# Patient Record
Sex: FEMALE | Race: WHITE | Employment: FULL TIME | URBAN - METROPOLITAN AREA
[De-identification: names, ages, dates, MRNs, and addresses within clinical notes are randomized per-mention and may not be internally consistent; named-entity substitution may affect disease eponyms.]

---

## 2017-01-18 ENCOUNTER — OFFICE VISIT (OUTPATIENT)
Dept: FAMILY MEDICINE CLINIC | Age: 47
End: 2017-01-18

## 2017-01-18 VITALS
DIASTOLIC BLOOD PRESSURE: 70 MMHG | BODY MASS INDEX: 23.46 KG/M2 | RESPIRATION RATE: 14 BRPM | SYSTOLIC BLOOD PRESSURE: 120 MMHG | HEIGHT: 66 IN | WEIGHT: 146 LBS | HEART RATE: 84 BPM | OXYGEN SATURATION: 98 % | TEMPERATURE: 97.7 F

## 2017-01-18 DIAGNOSIS — J01.40 ACUTE NON-RECURRENT PANSINUSITIS: Primary | ICD-10-CM

## 2017-01-18 PROCEDURE — 99213 OFFICE O/P EST LOW 20 MIN: CPT | Performed by: NURSE PRACTITIONER

## 2017-01-18 RX ORDER — FLUCONAZOLE 100 MG/1
100 TABLET ORAL DAILY
Qty: 2 TABLET | Refills: 0 | Status: SHIPPED | OUTPATIENT
Start: 2017-01-18 | End: 2017-01-25

## 2017-01-18 RX ORDER — FLUTICASONE PROPIONATE 50 MCG
1 SPRAY, SUSPENSION (ML) NASAL DAILY
Qty: 1 BOTTLE | Refills: 0 | Status: SHIPPED | OUTPATIENT
Start: 2017-01-18 | End: 2020-01-10 | Stop reason: SDUPTHER

## 2017-01-18 RX ORDER — AMOXICILLIN AND CLAVULANATE POTASSIUM 875; 125 MG/1; MG/1
1 TABLET, FILM COATED ORAL 2 TIMES DAILY
Qty: 14 TABLET | Refills: 0 | Status: SHIPPED | OUTPATIENT
Start: 2017-01-18 | End: 2017-01-25

## 2017-01-18 ASSESSMENT — ENCOUNTER SYMPTOMS
SINUS PAIN: 1
ANAL BLEEDING: 0
ABDOMINAL DISTENTION: 0
ABDOMINAL PAIN: 0
SINUS PRESSURE: 1
SWOLLEN GLANDS: 0
VOICE CHANGE: 0
DIARRHEA: 0
RHINORRHEA: 1
CONSTIPATION: 0
NAUSEA: 0
VOMITING: 0
SORE THROAT: 1
EYES NEGATIVE: 1
SHORTNESS OF BREATH: 0
WHEEZING: 0
BLOOD IN STOOL: 0
CHEST TIGHTNESS: 0
TROUBLE SWALLOWING: 0
COUGH: 1

## 2017-02-14 ENCOUNTER — OFFICE VISIT (OUTPATIENT)
Dept: FAMILY MEDICINE CLINIC | Age: 47
End: 2017-02-14

## 2017-02-14 VITALS
TEMPERATURE: 96.4 F | BODY MASS INDEX: 23.69 KG/M2 | OXYGEN SATURATION: 98 % | DIASTOLIC BLOOD PRESSURE: 72 MMHG | SYSTOLIC BLOOD PRESSURE: 120 MMHG | HEART RATE: 76 BPM | HEIGHT: 66 IN | WEIGHT: 147.38 LBS

## 2017-02-14 DIAGNOSIS — J01.41 ACUTE RECURRENT PANSINUSITIS: Primary | ICD-10-CM

## 2017-02-14 PROCEDURE — 99213 OFFICE O/P EST LOW 20 MIN: CPT | Performed by: NURSE PRACTITIONER

## 2017-02-14 RX ORDER — AZITHROMYCIN 250 MG/1
TABLET, FILM COATED ORAL
Qty: 6 TABLET | Refills: 0 | Status: SHIPPED | OUTPATIENT
Start: 2017-02-14 | End: 2017-05-30 | Stop reason: ALTCHOICE

## 2017-02-21 ASSESSMENT — ENCOUNTER SYMPTOMS
WHEEZING: 0
DIARRHEA: 0
ABDOMINAL DISTENTION: 0
RHINORRHEA: 1
TROUBLE SWALLOWING: 0
COUGH: 1
VOICE CHANGE: 0
CONSTIPATION: 0
SHORTNESS OF BREATH: 0
SORE THROAT: 1
SINUS PRESSURE: 1
ANAL BLEEDING: 0
VOMITING: 0
ABDOMINAL PAIN: 0
CHEST TIGHTNESS: 0
EYES NEGATIVE: 1
NAUSEA: 0
HOARSE VOICE: 0
BLOOD IN STOOL: 0
SWOLLEN GLANDS: 0

## 2017-05-30 ENCOUNTER — OFFICE VISIT (OUTPATIENT)
Dept: FAMILY MEDICINE CLINIC | Age: 47
End: 2017-05-30

## 2017-05-30 VITALS
WEIGHT: 150.4 LBS | SYSTOLIC BLOOD PRESSURE: 122 MMHG | HEART RATE: 73 BPM | BODY MASS INDEX: 24.17 KG/M2 | HEIGHT: 66 IN | TEMPERATURE: 97.3 F | OXYGEN SATURATION: 98 % | RESPIRATION RATE: 12 BRPM | DIASTOLIC BLOOD PRESSURE: 78 MMHG

## 2017-05-30 DIAGNOSIS — N83.202 BILATERAL OVARIAN CYSTS: ICD-10-CM

## 2017-05-30 DIAGNOSIS — R10.2 PELVIC PAIN IN FEMALE: Primary | ICD-10-CM

## 2017-05-30 DIAGNOSIS — N92.0 MENORRHAGIA WITH REGULAR CYCLE: ICD-10-CM

## 2017-05-30 DIAGNOSIS — N83.201 BILATERAL OVARIAN CYSTS: ICD-10-CM

## 2017-05-30 DIAGNOSIS — N94.6 DYSMENORRHEA: ICD-10-CM

## 2017-05-30 PROCEDURE — 99213 OFFICE O/P EST LOW 20 MIN: CPT | Performed by: FAMILY MEDICINE

## 2017-05-30 RX ORDER — CLONAZEPAM 0.5 MG/1
0.5 TABLET ORAL 2 TIMES DAILY
Qty: 30 TABLET | Refills: 0 | Status: CANCELLED | OUTPATIENT
Start: 2017-05-30

## 2017-05-30 ASSESSMENT — ENCOUNTER SYMPTOMS
COUGH: 0
SHORTNESS OF BREATH: 0
BLOOD IN STOOL: 0
CHEST TIGHTNESS: 0
VOMITING: 0
WHEEZING: 0
DIARRHEA: 0
NAUSEA: 0
ABDOMINAL PAIN: 0

## 2017-05-30 ASSESSMENT — PATIENT HEALTH QUESTIONNAIRE - PHQ9
SUM OF ALL RESPONSES TO PHQ9 QUESTIONS 1 & 2: 0
SUM OF ALL RESPONSES TO PHQ QUESTIONS 1-9: 0
1. LITTLE INTEREST OR PLEASURE IN DOING THINGS: 0
2. FEELING DOWN, DEPRESSED OR HOPELESS: 0

## 2017-06-06 RX ORDER — CLONAZEPAM 0.5 MG/1
0.5 TABLET ORAL 2 TIMES DAILY
Qty: 30 TABLET | Refills: 0 | Status: CANCELLED | OUTPATIENT
Start: 2017-06-06

## 2017-06-07 DIAGNOSIS — F41.9 ANXIETY: Primary | ICD-10-CM

## 2017-06-07 RX ORDER — CLONAZEPAM 0.5 MG/1
0.5 TABLET ORAL 2 TIMES DAILY PRN
Qty: 20 TABLET | Refills: 0 | Status: SHIPPED | OUTPATIENT
Start: 2017-06-07 | End: 2017-07-27 | Stop reason: SDUPTHER

## 2017-06-15 ENCOUNTER — OFFICE VISIT (OUTPATIENT)
Dept: OBGYN | Age: 47
End: 2017-06-15

## 2017-06-15 VITALS
HEIGHT: 66 IN | BODY MASS INDEX: 23.46 KG/M2 | DIASTOLIC BLOOD PRESSURE: 78 MMHG | WEIGHT: 146 LBS | SYSTOLIC BLOOD PRESSURE: 120 MMHG

## 2017-06-15 DIAGNOSIS — N83.202 BILATERAL OVARIAN CYSTS: Primary | ICD-10-CM

## 2017-06-15 DIAGNOSIS — N83.201 BILATERAL OVARIAN CYSTS: Primary | ICD-10-CM

## 2017-06-15 DIAGNOSIS — R10.2 PELVIC PAIN IN FEMALE: ICD-10-CM

## 2017-06-15 DIAGNOSIS — N83.202 BILATERAL OVARIAN CYSTS: ICD-10-CM

## 2017-06-15 DIAGNOSIS — N83.201 BILATERAL OVARIAN CYSTS: ICD-10-CM

## 2017-06-15 LAB — CA 125: 148.8 U/ML (ref 0–35)

## 2017-06-15 PROCEDURE — 99202 OFFICE O/P NEW SF 15 MIN: CPT | Performed by: OBSTETRICS & GYNECOLOGY

## 2017-06-15 ASSESSMENT — ENCOUNTER SYMPTOMS
ABDOMINAL DISTENTION: 0
EYES NEGATIVE: 1
BLOOD IN STOOL: 0
ALLERGIC/IMMUNOLOGIC NEGATIVE: 1
DIARRHEA: 0
VOMITING: 0
ANAL BLEEDING: 0
ABDOMINAL PAIN: 0
CONSTIPATION: 0
NAUSEA: 0
RECTAL PAIN: 0
RESPIRATORY NEGATIVE: 1

## 2017-06-16 ENCOUNTER — TELEPHONE (OUTPATIENT)
Dept: FAMILY MEDICINE CLINIC | Age: 47
End: 2017-06-16

## 2017-06-16 ENCOUNTER — TELEPHONE (OUTPATIENT)
Dept: OBGYN | Age: 47
End: 2017-06-16

## 2017-06-19 ENCOUNTER — HOSPITAL ENCOUNTER (OUTPATIENT)
Dept: ULTRASOUND IMAGING | Age: 47
Discharge: HOME | End: 2017-06-19
Payer: MEDICAID

## 2017-06-19 DIAGNOSIS — N83.202 BILATERAL OVARIAN CYSTS: ICD-10-CM

## 2017-06-19 DIAGNOSIS — N83.201 BILATERAL OVARIAN CYSTS: ICD-10-CM

## 2017-06-19 DIAGNOSIS — R10.2 PELVIC PAIN IN FEMALE: ICD-10-CM

## 2017-06-19 PROCEDURE — 76856 US EXAM PELVIC COMPLETE: CPT

## 2017-06-19 PROCEDURE — 76830 TRANSVAGINAL US NON-OB: CPT

## 2017-07-26 DIAGNOSIS — F41.9 ANXIETY: ICD-10-CM

## 2017-07-27 DIAGNOSIS — F41.9 ANXIETY: ICD-10-CM

## 2017-07-27 RX ORDER — CLONAZEPAM 0.5 MG/1
0.5 TABLET ORAL 2 TIMES DAILY PRN
Qty: 20 TABLET | Refills: 0 | OUTPATIENT
Start: 2017-07-27

## 2017-07-27 RX ORDER — CLONAZEPAM 0.5 MG/1
0.5 TABLET ORAL 2 TIMES DAILY PRN
Qty: 20 TABLET | Refills: 0 | Status: SHIPPED | OUTPATIENT
Start: 2017-07-27 | End: 2017-08-29 | Stop reason: SDUPTHER

## 2017-08-14 ENCOUNTER — HOSPITAL ENCOUNTER (OUTPATIENT)
Dept: LAB | Age: 47
Discharge: HOME | End: 2017-08-14
Payer: MEDICAID

## 2017-08-14 LAB
ANION GAP SERPL CALCULATED.3IONS-SCNC: 18 MEQ/L (ref 7–13)
BASOPHILS ABSOLUTE: 0.1 K/UL (ref 0–0.2)
BASOPHILS RELATIVE PERCENT: 1.3 %
BUN BLDV-MCNC: 10 MG/DL (ref 6–20)
CALCIUM SERPL-MCNC: 8.9 MG/DL (ref 8.6–10.2)
CHLORIDE BLD-SCNC: 97 MEQ/L (ref 98–107)
CO2: 24 MEQ/L (ref 22–29)
CREAT SERPL-MCNC: 0.66 MG/DL (ref 0.5–0.9)
EOSINOPHILS ABSOLUTE: 0.2 K/UL (ref 0–0.7)
EOSINOPHILS RELATIVE PERCENT: 2.1 %
GFR AFRICAN AMERICAN: >60
GFR NON-AFRICAN AMERICAN: >60
GLUCOSE BLD-MCNC: 173 MG/DL (ref 74–109)
HCT VFR BLD CALC: 35.8 % (ref 37–47)
HEMOGLOBIN: 11.9 G/DL (ref 12–16)
LYMPHOCYTES ABSOLUTE: 1 K/UL (ref 1–4.8)
LYMPHOCYTES RELATIVE PERCENT: 10.5 %
MCH RBC QN AUTO: 31.6 PG (ref 27–31.3)
MCHC RBC AUTO-ENTMCNC: 33.3 % (ref 33–37)
MCV RBC AUTO: 95.1 FL (ref 82–100)
MONOCYTES ABSOLUTE: 1 K/UL (ref 0.2–0.8)
MONOCYTES RELATIVE PERCENT: 10.5 %
NEUTROPHILS ABSOLUTE: 7 K/UL (ref 1.4–6.5)
NEUTROPHILS RELATIVE PERCENT: 75.6 %
PDW BLD-RTO: 14.2 % (ref 11.5–14.5)
PLATELET # BLD: 242 K/UL (ref 130–400)
POTASSIUM SERPL-SCNC: 4.1 MEQ/L (ref 3.5–5.1)
RBC # BLD: 3.77 M/UL (ref 4.2–5.4)
SODIUM BLD-SCNC: 139 MEQ/L (ref 132–144)
WBC # BLD: 9.3 K/UL (ref 4.8–10.8)

## 2017-08-14 PROCEDURE — 87086 URINE CULTURE/COLONY COUNT: CPT

## 2017-08-14 PROCEDURE — 80048 BASIC METABOLIC PNL TOTAL CA: CPT

## 2017-08-14 PROCEDURE — 36415 COLL VENOUS BLD VENIPUNCTURE: CPT

## 2017-08-14 PROCEDURE — 85025 COMPLETE CBC W/AUTO DIFF WBC: CPT

## 2017-08-16 LAB — URINE CULTURE, ROUTINE: NORMAL

## 2017-08-23 DIAGNOSIS — F41.9 ANXIETY: ICD-10-CM

## 2017-08-24 RX ORDER — CLONAZEPAM 0.5 MG/1
0.5 TABLET ORAL 2 TIMES DAILY PRN
Qty: 20 TABLET | Refills: 0 | OUTPATIENT
Start: 2017-08-24

## 2017-08-29 DIAGNOSIS — F41.9 ANXIETY: ICD-10-CM

## 2017-08-29 RX ORDER — CLONAZEPAM 0.5 MG/1
0.5 TABLET ORAL 2 TIMES DAILY PRN
Qty: 20 TABLET | Refills: 0 | Status: SHIPPED | OUTPATIENT
Start: 2017-08-29 | End: 2017-10-03 | Stop reason: SDUPTHER

## 2017-10-03 DIAGNOSIS — F41.9 ANXIETY: ICD-10-CM

## 2017-10-03 RX ORDER — CLONAZEPAM 0.5 MG/1
0.5 TABLET ORAL 2 TIMES DAILY PRN
Qty: 20 TABLET | Refills: 0 | Status: SHIPPED | OUTPATIENT
Start: 2017-10-03 | End: 2017-10-17 | Stop reason: SDUPTHER

## 2017-10-03 NOTE — TELEPHONE ENCOUNTER
Scheduled appointment 10/17/2017, only has 4 pills left, needs short term refill for clonazePAM (KLONOPIN) 0.5 MG tablet to [address] 08/14/17 ordered by Daisy Jaime MD

## 2017-10-17 ENCOUNTER — OFFICE VISIT (OUTPATIENT)
Dept: FAMILY MEDICINE CLINIC | Age: 47
End: 2017-10-17

## 2017-10-17 VITALS
OXYGEN SATURATION: 95 % | RESPIRATION RATE: 12 BRPM | DIASTOLIC BLOOD PRESSURE: 70 MMHG | HEIGHT: 66 IN | SYSTOLIC BLOOD PRESSURE: 128 MMHG | WEIGHT: 144 LBS | HEART RATE: 80 BPM | TEMPERATURE: 97.8 F | BODY MASS INDEX: 23.14 KG/M2

## 2017-10-17 DIAGNOSIS — F41.0 PANIC ATTACKS: Primary | ICD-10-CM

## 2017-10-17 DIAGNOSIS — F41.9 ANXIETY: ICD-10-CM

## 2017-10-17 PROCEDURE — 99213 OFFICE O/P EST LOW 20 MIN: CPT | Performed by: NURSE PRACTITIONER

## 2017-10-17 RX ORDER — CLONAZEPAM 0.5 MG/1
0.5 TABLET ORAL 2 TIMES DAILY PRN
Qty: 20 TABLET | Refills: 2 | Status: SHIPPED | OUTPATIENT
Start: 2017-10-17 | End: 2018-03-08 | Stop reason: SDUPTHER

## 2017-10-17 NOTE — PROGRESS NOTES
The patient presents today with anxiety. She has had issues with anxiety for several  years. The patient has been treated in the past. Previous medications include klonopin. The patient does have a history of panic attacks. There are not suicidal thoughts or homicidal thoughts present. She states that she only takes a half of a tablet at a time and takes very sparingly. She feels reassured just knowing that she has it with her when needed. EXAM:  Constitutional Blood pressure 128/70, pulse 80, temperature 97.8 °F (36.6 °C), temperature source Temporal, resp. rate 12, height 5' 6\" (1.676 m), weight 144 lb (65.3 kg), last menstrual period 06/12/2017, SpO2 95 %, not currently breastfeeding. .  She has a normal affect, no acute distress, appears well developed and well nourished. Lungs are clear with equal breath sounds. Chest wall is not tender. Heart is in a regular rhythm with normal rate and no murmurs, rubs, or gallops. DIAGNOSIS:   1. Panic attacks  clonazePAM (KLONOPIN) 0.5 MG tablet   2. Anxiety  clonazePAM (KLONOPIN) 0.5 MG tablet         PLAN: Include orders in the DX section. Follow up: 3 months and as needed. She recently had blood work done before hysterectomy. Anxiety symptoms have improved since surgery. She plans to follow with me only for anxiety medication. Controlled Substances Monitoring:     Attestation: The Prescription Monitoring Report for this patient was reviewed today. (Mike Jaime CNP)  Documentation: Possible medication side effects, risk of tolerance and/or dependence, and alternative treatments discussed., No signs of potential drug abuse or diversion identified., Medication contract signed today.  Mike Jaime CNP)            Electronically signed by Aidee Spence, 3:12 PM 10/17/17

## 2017-10-17 NOTE — PROGRESS NOTES
Subjective:      Patient ID: Jeison Michel is a 56 y.o. female.     HPI    Review of Systems    Objective:   Physical Exam    Assessment:      ***      Plan:      ***

## 2017-12-11 ENCOUNTER — HOSPITAL ENCOUNTER (OUTPATIENT)
Dept: CT IMAGING | Age: 47
Discharge: HOME | End: 2017-12-11
Payer: MEDICARE

## 2017-12-11 ENCOUNTER — HOSPITAL ENCOUNTER (OUTPATIENT)
Dept: CT IMAGING | Age: 47
End: 2017-12-11
Payer: MEDICARE

## 2017-12-11 DIAGNOSIS — R10.32 ABDOMINAL PAIN, LEFT LOWER QUADRANT: ICD-10-CM

## 2017-12-11 PROCEDURE — 6360000004 HC RX CONTRAST MEDICATION: Performed by: NURSE PRACTITIONER

## 2017-12-11 PROCEDURE — 2500000003 HC RX 250 WO HCPCS: Performed by: NURSE PRACTITIONER

## 2017-12-11 PROCEDURE — 74177 CT ABD & PELVIS W/CONTRAST: CPT

## 2017-12-11 RX ADMIN — BARIUM SULFATE 450 ML: 20 SUSPENSION ORAL at 12:40

## 2017-12-11 RX ADMIN — IOPAMIDOL 100 ML: 755 INJECTION, SOLUTION INTRAVENOUS at 13:49

## 2018-03-06 DIAGNOSIS — F41.0 PANIC ATTACKS: ICD-10-CM

## 2018-03-06 DIAGNOSIS — F41.9 ANXIETY: ICD-10-CM

## 2018-03-06 RX ORDER — CLONAZEPAM 0.5 MG/1
0.5 TABLET ORAL 2 TIMES DAILY PRN
Qty: 20 TABLET | Refills: 2 | OUTPATIENT
Start: 2018-03-06

## 2018-03-08 ENCOUNTER — OFFICE VISIT (OUTPATIENT)
Dept: FAMILY MEDICINE CLINIC | Age: 48
End: 2018-03-08
Payer: MEDICARE

## 2018-03-08 ENCOUNTER — HOSPITAL ENCOUNTER (OUTPATIENT)
Age: 48
Setting detail: SPECIMEN
Discharge: HOME | End: 2018-03-08
Payer: MEDICARE

## 2018-03-08 VITALS
SYSTOLIC BLOOD PRESSURE: 100 MMHG | OXYGEN SATURATION: 96 % | RESPIRATION RATE: 12 BRPM | WEIGHT: 143 LBS | TEMPERATURE: 98.6 F | DIASTOLIC BLOOD PRESSURE: 64 MMHG | HEART RATE: 71 BPM | BODY MASS INDEX: 22.98 KG/M2 | HEIGHT: 66 IN

## 2018-03-08 DIAGNOSIS — F41.9 ANXIETY: Primary | ICD-10-CM

## 2018-03-08 DIAGNOSIS — Z79.899 HIGH RISK MEDICATION USE: ICD-10-CM

## 2018-03-08 DIAGNOSIS — Z12.39 BREAST CANCER SCREENING: ICD-10-CM

## 2018-03-08 DIAGNOSIS — F41.0 PANIC ATTACKS: ICD-10-CM

## 2018-03-08 DIAGNOSIS — F17.200 CURRENT SMOKER: ICD-10-CM

## 2018-03-08 PROCEDURE — G8420 CALC BMI NORM PARAMETERS: HCPCS | Performed by: NURSE PRACTITIONER

## 2018-03-08 PROCEDURE — 99213 OFFICE O/P EST LOW 20 MIN: CPT | Performed by: NURSE PRACTITIONER

## 2018-03-08 PROCEDURE — G8484 FLU IMMUNIZE NO ADMIN: HCPCS | Performed by: NURSE PRACTITIONER

## 2018-03-08 PROCEDURE — G8427 DOCREV CUR MEDS BY ELIG CLIN: HCPCS | Performed by: NURSE PRACTITIONER

## 2018-03-08 PROCEDURE — 80307 DRUG TEST PRSMV CHEM ANLYZR: CPT

## 2018-03-08 PROCEDURE — 4004F PT TOBACCO SCREEN RCVD TLK: CPT | Performed by: NURSE PRACTITIONER

## 2018-03-08 RX ORDER — CLONAZEPAM 0.5 MG/1
0.5 TABLET ORAL 2 TIMES DAILY PRN
Qty: 20 TABLET | Refills: 2 | Status: SHIPPED | OUTPATIENT
Start: 2018-03-08 | End: 2018-07-06 | Stop reason: SDUPTHER

## 2018-03-08 NOTE — PROGRESS NOTES
The patient presents today with anxiety. She has had issues with anxiety for several years. The patient has been treated in the past. Previous medications include klonopin. The patient does have a history of panic attacks. There are not suicidal thoughts or homicidal thoughts present. She states that she has been able to avoid full-blown panic attacks with having Klonopin on hand. She states that when she does have a full panic attack she has to go to the emergency room because of significant cardiac symptoms. She states that this has not happened lately because she has a medication on hand. She feels that a lot of her anxiety improved since having GYN surgery last year as many of her symptoms were likely hormonal in nature. She is a current smoker who is not ready for cessation at this time. She states that she needs mammogram order and always has to have ultrasound done due to the density of her breasts. EXAM:  Constitutional Blood pressure 100/64, pulse 71, temperature 98.6 °F (37 °C), temperature source Temporal, resp. rate 12, height 5' 6\" (1.676 m), weight 143 lb (64.9 kg), last menstrual period 06/12/2017, SpO2 96 %, not currently breastfeeding. .  She has a normal affect, no acute distress, appears well developed and well nourished. Lungs are clear with equal breath sounds. Chest wall is not tender. Heart is in a regular rhythm with normal rate and no murmurs, rubs, or gallops. DIAGNOSIS:   1. Anxiety  clonazePAM (KLONOPIN) 0.5 MG tablet   2. Panic attacks  clonazePAM (KLONOPIN) 0.5 MG tablet   3. Current smoker     4. High risk medication use  Pain Management Drug Screen   5. Breast cancer screening  RANDA DIGITAL SCREEN W CAD BILATERAL         PLAN: Include orders in the DX section. Follow up in 3 months and as needed for medication refill. She continues to take the Klonopin very sparingly for onset of panic attack.   This medication has significantly reduced severity of panic symptoms. Mammogram order given with ultrasound as needed per patient request due to history of dense breasts. See [facility] documentation to correlate with visit note.         Electronically signed by Derik ROMERO, 2:19 PM 3/8/18

## 2018-03-11 LAB
6-ACETYLMORPHINE: NOT DETECTED
7-AMINOCLONAZEPAM: NOT DETECTED
ALPHA-OH-ALPRAZOLAM: NOT DETECTED
ALPRAZOLAM: NOT DETECTED
AMPHETAMINE: NOT DETECTED
BARBITURATES: NOT DETECTED
BENZOYLECGONINE: NOT DETECTED
BUPRENORPHINE: NOT DETECTED
CARISOPRODOL: NOT DETECTED
CLONAZEPAM: NOT DETECTED
CODEINE: NOT DETECTED
CREATININE URINE: 122.2 MG/DL (ref 20–400)
DIAZEPAM: NOT DETECTED
EER PAIN MGT DRUG PANEL, HIGH RES/EMIT U: NORMAL
ETHYL GLUCURONIDE: PRESENT
FENTANYL: NOT DETECTED
HYDROCODONE: NOT DETECTED
HYDROMORPHONE: NOT DETECTED
LORAZEPAM: NOT DETECTED
MARIJUANA METABOLITE: NOT DETECTED
MDA: NOT DETECTED
MDEA: NOT DETECTED
MDMA URINE: NOT DETECTED
MEPERIDINE: NOT DETECTED
METHADONE: NOT DETECTED
METHAMPHETAMINE: NOT DETECTED
METHYLPHENIDATE: NOT DETECTED
MIDAZOLAM: NOT DETECTED
MORPHINE: NOT DETECTED
NORBUPRENORPHINE, FREE: NOT DETECTED
NORDIAZEPAM: NOT DETECTED
NORFENTANYL: NOT DETECTED
NORHYDROCODONE, URINE: NOT DETECTED
NOROXYCODONE: NOT DETECTED
NOROXYMORPHONE, URINE: NOT DETECTED
OXAZEPAM: NOT DETECTED
OXYCODONE: NOT DETECTED
OXYMORPHONE: NOT DETECTED
PAIN MANAGEMENT DRUG PANEL: NORMAL
PCP: NOT DETECTED
PHENTERMINE: NOT DETECTED
PROPOXYPHENE: NOT DETECTED
TAPENTADOL, URINE: NOT DETECTED
TAPENTADOL-O-SULFATE, URINE: NOT DETECTED
TEMAZEPAM: NOT DETECTED
TRAMADOL: NOT DETECTED
ZOLPIDEM: NOT DETECTED

## 2018-03-15 ENCOUNTER — OFFICE VISIT (OUTPATIENT)
Dept: FAMILY MEDICINE CLINIC | Age: 48
End: 2018-03-15
Payer: MEDICARE

## 2018-03-15 VITALS
DIASTOLIC BLOOD PRESSURE: 80 MMHG | OXYGEN SATURATION: 96 % | RESPIRATION RATE: 14 BRPM | HEIGHT: 66 IN | TEMPERATURE: 98.2 F | HEART RATE: 80 BPM | WEIGHT: 146.6 LBS | BODY MASS INDEX: 23.56 KG/M2 | SYSTOLIC BLOOD PRESSURE: 124 MMHG

## 2018-03-15 DIAGNOSIS — H65.192 ACUTE MIDDLE EAR EFFUSION, LEFT: ICD-10-CM

## 2018-03-15 DIAGNOSIS — R05.9 COUGH: ICD-10-CM

## 2018-03-15 DIAGNOSIS — J01.40 ACUTE NON-RECURRENT PANSINUSITIS: Primary | ICD-10-CM

## 2018-03-15 PROCEDURE — G8484 FLU IMMUNIZE NO ADMIN: HCPCS | Performed by: NURSE PRACTITIONER

## 2018-03-15 PROCEDURE — G8427 DOCREV CUR MEDS BY ELIG CLIN: HCPCS | Performed by: NURSE PRACTITIONER

## 2018-03-15 PROCEDURE — 99213 OFFICE O/P EST LOW 20 MIN: CPT | Performed by: NURSE PRACTITIONER

## 2018-03-15 PROCEDURE — 4004F PT TOBACCO SCREEN RCVD TLK: CPT | Performed by: NURSE PRACTITIONER

## 2018-03-15 PROCEDURE — G8420 CALC BMI NORM PARAMETERS: HCPCS | Performed by: NURSE PRACTITIONER

## 2018-03-15 RX ORDER — BENZONATATE 100 MG/1
100 CAPSULE ORAL 3 TIMES DAILY PRN
Qty: 21 CAPSULE | Refills: 0 | Status: SHIPPED | OUTPATIENT
Start: 2018-03-15 | End: 2018-03-22

## 2018-03-15 RX ORDER — LORATADINE 10 MG/1
10 TABLET ORAL DAILY
Qty: 30 TABLET | Refills: 0 | Status: SHIPPED | OUTPATIENT
Start: 2018-03-15 | End: 2018-04-14

## 2018-03-15 RX ORDER — AMOXICILLIN AND CLAVULANATE POTASSIUM 875; 125 MG/1; MG/1
1 TABLET, FILM COATED ORAL 2 TIMES DAILY
Qty: 20 TABLET | Refills: 0 | Status: SHIPPED | OUTPATIENT
Start: 2018-03-15 | End: 2018-03-25

## 2018-03-15 ASSESSMENT — ENCOUNTER SYMPTOMS
SINUS PAIN: 1
VOMITING: 0
WHEEZING: 1
CONSTIPATION: 0
HEARTBURN: 0
HEMOPTYSIS: 0
DIARRHEA: 0
COUGH: 1
SINUS PRESSURE: 1
TROUBLE SWALLOWING: 0
ABDOMINAL DISTENTION: 0
NAUSEA: 0
SHORTNESS OF BREATH: 1
RHINORRHEA: 1
ABDOMINAL PAIN: 0
SORE THROAT: 0
CHEST TIGHTNESS: 1

## 2018-03-15 NOTE — PROGRESS NOTES
N/A     Occupational History    Not on file. Social History Main Topics    Smoking status: Current Every Day Smoker     Packs/day: 0.50    Smokeless tobacco: Never Used    Alcohol use Yes    Drug use: No    Sexual activity: Yes     Partners: Male      Comment: Vasectomy     Other Topics Concern    Not on file     Social History Narrative    No narrative on file     Family History   Problem Relation Age of Onset    Adopted: Yes    No Known Problems Mother     No Known Problems Father     No Known Problems Paternal Grandfather     No Known Problems Paternal Grandmother     No Known Problems Maternal Grandmother     No Known Problems Maternal Grandfather     No Known Problems Brother     No Known Problems Sister     No Known Problems Other     Breast Cancer Neg Hx     Cancer Neg Hx     Colon Cancer Neg Hx     Diabetes Neg Hx     Eclampsia Neg Hx     Hypertension Neg Hx     Ovarian Cancer Neg Hx      Labor Neg Hx     Spont Abortions Neg Hx     Stroke Neg Hx      No Known Allergies  Current Outpatient Prescriptions   Medication Sig Dispense Refill    amoxicillin-clavulanate (AUGMENTIN) 875-125 MG per tablet Take 1 tablet by mouth 2 times daily for 10 days 20 tablet 0    benzonatate (TESSALON) 100 MG capsule Take 1 capsule by mouth 3 times daily as needed for Cough 21 capsule 0    loratadine (CLARITIN) 10 MG tablet Take 1 tablet by mouth daily 30 tablet 0    clonazePAM (KLONOPIN) 0.5 MG tablet Take 1 tablet by mouth 2 times daily as needed for Anxiety for up to 90 days. 20 tablet 2    fluticasone (FLONASE) 50 MCG/ACT nasal spray 1 spray by Nasal route daily 1 Bottle 0     No current facility-administered medications for this visit. PMH, Surgical Hx, Family Hx, and Social Hx reviewed and updated. Health Maintenance reviewed.     Objective    Vitals:    03/15/18 1737   BP: 124/80   Site: Left Arm   Position: Sitting   Cuff Size: Small Adult   Pulse: 80   Resp: 14   Temp: 98.2

## 2018-04-03 ENCOUNTER — HOSPITAL ENCOUNTER (OUTPATIENT)
Dept: WOMENS IMAGING | Age: 48
Discharge: HOME | End: 2018-04-05
Payer: MEDICARE

## 2018-04-03 DIAGNOSIS — Z12.39 BREAST CANCER SCREENING: ICD-10-CM

## 2018-04-03 PROCEDURE — 77067 SCR MAMMO BI INCL CAD: CPT

## 2018-07-06 ENCOUNTER — OFFICE VISIT (OUTPATIENT)
Dept: FAMILY MEDICINE CLINIC | Age: 48
End: 2018-07-06
Payer: MEDICARE

## 2018-07-06 VITALS
TEMPERATURE: 98.3 F | OXYGEN SATURATION: 96 % | DIASTOLIC BLOOD PRESSURE: 60 MMHG | HEIGHT: 66 IN | RESPIRATION RATE: 12 BRPM | WEIGHT: 143 LBS | SYSTOLIC BLOOD PRESSURE: 100 MMHG | BODY MASS INDEX: 22.98 KG/M2 | HEART RATE: 61 BPM

## 2018-07-06 DIAGNOSIS — F41.9 ANXIETY: Primary | ICD-10-CM

## 2018-07-06 DIAGNOSIS — F17.200 CURRENT SMOKER: ICD-10-CM

## 2018-07-06 DIAGNOSIS — R53.83 OTHER FATIGUE: ICD-10-CM

## 2018-07-06 DIAGNOSIS — F41.0 PANIC ATTACKS: ICD-10-CM

## 2018-07-06 DIAGNOSIS — Z00.00 ROUTINE GENERAL MEDICAL EXAMINATION AT A HEALTH CARE FACILITY: ICD-10-CM

## 2018-07-06 PROCEDURE — 4004F PT TOBACCO SCREEN RCVD TLK: CPT | Performed by: NURSE PRACTITIONER

## 2018-07-06 PROCEDURE — 99214 OFFICE O/P EST MOD 30 MIN: CPT | Performed by: NURSE PRACTITIONER

## 2018-07-06 PROCEDURE — G8420 CALC BMI NORM PARAMETERS: HCPCS | Performed by: NURSE PRACTITIONER

## 2018-07-06 PROCEDURE — G8427 DOCREV CUR MEDS BY ELIG CLIN: HCPCS | Performed by: NURSE PRACTITIONER

## 2018-07-06 RX ORDER — CLONAZEPAM 0.5 MG/1
0.5 TABLET ORAL 2 TIMES DAILY PRN
Qty: 20 TABLET | Refills: 2 | Status: SHIPPED | OUTPATIENT
Start: 2018-07-06 | End: 2018-11-15 | Stop reason: SDUPTHER

## 2018-07-06 ASSESSMENT — PATIENT HEALTH QUESTIONNAIRE - PHQ9
SUM OF ALL RESPONSES TO PHQ QUESTIONS 1-9: 0
SUM OF ALL RESPONSES TO PHQ9 QUESTIONS 1 & 2: 0
2. FEELING DOWN, DEPRESSED OR HOPELESS: 0
1. LITTLE INTEREST OR PLEASURE IN DOING THINGS: 0

## 2018-08-13 ENCOUNTER — PATIENT MESSAGE (OUTPATIENT)
Dept: FAMILY MEDICINE CLINIC | Age: 48
End: 2018-08-13

## 2018-10-20 ENCOUNTER — OFFICE VISIT (OUTPATIENT)
Dept: FAMILY MEDICINE CLINIC | Age: 48
End: 2018-10-20
Payer: MEDICARE

## 2018-10-20 VITALS
SYSTOLIC BLOOD PRESSURE: 128 MMHG | BODY MASS INDEX: 23.43 KG/M2 | HEART RATE: 84 BPM | OXYGEN SATURATION: 98 % | HEIGHT: 66 IN | RESPIRATION RATE: 16 BRPM | WEIGHT: 145.8 LBS | TEMPERATURE: 98 F | DIASTOLIC BLOOD PRESSURE: 70 MMHG

## 2018-10-20 DIAGNOSIS — H10.9 CONJUNCTIVITIS OF LEFT EYE, UNSPECIFIED CONJUNCTIVITIS TYPE: Primary | ICD-10-CM

## 2018-10-20 PROCEDURE — 4004F PT TOBACCO SCREEN RCVD TLK: CPT | Performed by: NURSE PRACTITIONER

## 2018-10-20 PROCEDURE — G8484 FLU IMMUNIZE NO ADMIN: HCPCS | Performed by: NURSE PRACTITIONER

## 2018-10-20 PROCEDURE — G8420 CALC BMI NORM PARAMETERS: HCPCS | Performed by: NURSE PRACTITIONER

## 2018-10-20 PROCEDURE — G8427 DOCREV CUR MEDS BY ELIG CLIN: HCPCS | Performed by: NURSE PRACTITIONER

## 2018-10-20 PROCEDURE — 99213 OFFICE O/P EST LOW 20 MIN: CPT | Performed by: NURSE PRACTITIONER

## 2018-10-20 RX ORDER — POLYMYXIN B SULFATE AND TRIMETHOPRIM 1; 10000 MG/ML; [USP'U]/ML
1 SOLUTION OPHTHALMIC EVERY 4 HOURS
Qty: 1 BOTTLE | Refills: 0 | Status: SHIPPED | OUTPATIENT
Start: 2018-10-20 | End: 2018-10-27

## 2018-10-20 ASSESSMENT — ENCOUNTER SYMPTOMS
COUGH: 0
EYE PAIN: 0
EYE ITCHING: 1
DIARRHEA: 0
EYE REDNESS: 1
WHEEZING: 0
VOMITING: 0
EYE DISCHARGE: 1
SORE THROAT: 0
SWOLLEN GLANDS: 0
NAUSEA: 0
RHINORRHEA: 0
DOUBLE VISION: 0
PHOTOPHOBIA: 0
SHORTNESS OF BREATH: 0
ABDOMINAL PAIN: 0

## 2018-10-20 NOTE — PROGRESS NOTES
Subjective:     Patient ID: Hal Vázquez is a 53 y.o. female who presentstoday for:  Chief Complaint   Patient presents with    Conjunctivitis     patient c/o eye infection began today        Conjunctivitis    The current episode started yesterday. The onset was sudden. The problem has been unchanged. The problem is mild. Associated symptoms include eye itching, eye discharge and eye redness. Pertinent negatives include no fever, no decreased vision, no double vision, no photophobia, no abdominal pain, no diarrhea, no nausea, no vomiting, no congestion, no ear discharge, no ear pain, no headaches, no hearing loss, no mouth sores, no rhinorrhea, no sore throat, no swollen glands, no cough, no wheezing and no eye pain. The left eye is affected. The eyelid exhibits no abnormality. Past Medical History:   Diagnosis Date    Anxiety     Gestational diabetes     With last child     Current Outpatient Prescriptions on File Prior to Visit   Medication Sig Dispense Refill    fluticasone (FLONASE) 50 MCG/ACT nasal spray 1 spray by Nasal route daily 1 Bottle 0    clonazePAM (KLONOPIN) 0.5 MG tablet Take 1 tablet by mouth 2 times daily as needed for Anxiety for up to 90 days. . 20 tablet 2     No current facility-administered medications on file prior to visit. Past Surgical History:   Procedure Laterality Date    HYSTERECTOMY, VAGINAL       Family History   Problem Relation Age of Onset    Adopted:  Yes    No Known Problems Mother     No Known Problems Father     No Known Problems Paternal Grandfather     No Known Problems Paternal Grandmother     No Known Problems Maternal Grandmother     No Known Problems Maternal Grandfather     No Known Problems Brother     No Known Problems Sister     No Known Problems Other     Breast Cancer Neg Hx     Cancer Neg Hx     Colon Cancer Neg Hx     Diabetes Neg Hx     Eclampsia Neg Hx     Hypertension Neg Hx     Ovarian Cancer Neg Hx      Labor Neg Hx

## 2018-10-28 ENCOUNTER — HOSPITAL ENCOUNTER (EMERGENCY)
Age: 48
Discharge: HOME | End: 2018-10-28
Attending: EMERGENCY MEDICINE
Payer: MEDICARE

## 2018-10-28 VITALS
RESPIRATION RATE: 16 BRPM | HEART RATE: 88 BPM | BODY MASS INDEX: 22.5 KG/M2 | TEMPERATURE: 97.8 F | DIASTOLIC BLOOD PRESSURE: 88 MMHG | HEIGHT: 66 IN | SYSTOLIC BLOOD PRESSURE: 150 MMHG | WEIGHT: 140 LBS | OXYGEN SATURATION: 97 %

## 2018-10-28 DIAGNOSIS — F10.920 ACUTE ALCOHOLIC INTOXICATION WITHOUT COMPLICATION (HCC): Primary | ICD-10-CM

## 2018-10-28 LAB
ACETAMINOPHEN LEVEL: <15 UG/ML (ref 10–30)
ALBUMIN SERPL-MCNC: 5.5 G/DL (ref 3.9–4.9)
ALP BLD-CCNC: 138 U/L (ref 40–130)
ALT SERPL-CCNC: 42 U/L (ref 0–33)
AMPHETAMINE SCREEN, URINE: NORMAL
ANION GAP SERPL CALCULATED.3IONS-SCNC: 21 MEQ/L (ref 7–13)
AST SERPL-CCNC: 30 U/L (ref 0–35)
BARBITURATE SCREEN URINE: NORMAL
BASOPHILS ABSOLUTE: 0.1 K/UL (ref 0–0.2)
BASOPHILS RELATIVE PERCENT: 0.6 %
BENZODIAZEPINE SCREEN, URINE: NORMAL
BILIRUB SERPL-MCNC: 0.2 MG/DL (ref 0–1.2)
BILIRUBIN URINE: NEGATIVE
BLOOD, URINE: NEGATIVE
BUN BLDV-MCNC: 11 MG/DL (ref 6–20)
CALCIUM SERPL-MCNC: 9.7 MG/DL (ref 8.6–10.2)
CANNABINOID SCREEN URINE: NORMAL
CHLORIDE BLD-SCNC: 101 MEQ/L (ref 98–107)
CLARITY: CLEAR
CO2: 23 MEQ/L (ref 22–29)
COCAINE METABOLITE SCREEN URINE: NORMAL
COLOR: YELLOW
CREAT SERPL-MCNC: 0.94 MG/DL (ref 0.5–0.9)
EOSINOPHILS ABSOLUTE: 0.3 K/UL (ref 0–0.7)
EOSINOPHILS RELATIVE PERCENT: 2.9 %
ETHANOL PERCENT: 0.24 G/DL
ETHANOL: 270 MG/DL (ref 0–0.08)
GFR AFRICAN AMERICAN: >60
GFR NON-AFRICAN AMERICAN: >60
GLOBULIN: 2.9 G/DL (ref 2.3–3.5)
GLUCOSE BLD-MCNC: 122 MG/DL (ref 74–109)
GLUCOSE URINE: NEGATIVE MG/DL
HCT VFR BLD CALC: 47 % (ref 37–47)
HEMOGLOBIN: 15.9 G/DL (ref 12–16)
KETONES, URINE: NEGATIVE MG/DL
LEUKOCYTE ESTERASE, URINE: NEGATIVE
LYMPHOCYTES ABSOLUTE: 3.4 K/UL (ref 1–4.8)
LYMPHOCYTES RELATIVE PERCENT: 36.3 %
Lab: NORMAL
MAGNESIUM: 2.1 MG/DL (ref 1.7–2.3)
MCH RBC QN AUTO: 31.9 PG (ref 27–31.3)
MCHC RBC AUTO-ENTMCNC: 33.9 % (ref 33–37)
MCV RBC AUTO: 93.9 FL (ref 82–100)
MONOCYTES ABSOLUTE: 0.6 K/UL (ref 0.2–0.8)
MONOCYTES RELATIVE PERCENT: 6.4 %
NEUTROPHILS ABSOLUTE: 5 K/UL (ref 1.4–6.5)
NEUTROPHILS RELATIVE PERCENT: 53.8 %
NITRITE, URINE: NEGATIVE
OPIATE SCREEN URINE: NORMAL
PDW BLD-RTO: 13.7 % (ref 11.5–14.5)
PH UA: 5.5 (ref 5–9)
PHENCYCLIDINE SCREEN URINE: NORMAL
PLATELET # BLD: 255 K/UL (ref 130–400)
POTASSIUM SERPL-SCNC: 4 MEQ/L (ref 3.5–5.1)
PROTEIN UA: NEGATIVE MG/DL
RBC # BLD: 5 M/UL (ref 4.2–5.4)
SALICYLATE, SERUM: <0.3 MG/DL (ref 15–30)
SODIUM BLD-SCNC: 145 MEQ/L (ref 132–144)
SPECIFIC GRAVITY UA: <=1.005 (ref 1–1.03)
TOTAL PROTEIN: 8.4 G/DL (ref 6.4–8.1)
TRICYCLIC, URINE: NORMAL
URINE REFLEX TO CULTURE: NORMAL
UROBILINOGEN, URINE: 0.2 E.U./DL
WBC # BLD: 9.2 K/UL (ref 4.8–10.8)

## 2018-10-28 PROCEDURE — 81003 URINALYSIS AUTO W/O SCOPE: CPT

## 2018-10-28 PROCEDURE — 80306 DRUG TEST PRSMV INSTRMNT: CPT

## 2018-10-28 PROCEDURE — 2580000003 HC RX 258: Performed by: EMERGENCY MEDICINE

## 2018-10-28 PROCEDURE — 85025 COMPLETE CBC W/AUTO DIFF WBC: CPT

## 2018-10-28 PROCEDURE — 83735 ASSAY OF MAGNESIUM: CPT

## 2018-10-28 PROCEDURE — G0480 DRUG TEST DEF 1-7 CLASSES: HCPCS

## 2018-10-28 PROCEDURE — 99284 EMERGENCY DEPT VISIT MOD MDM: CPT

## 2018-10-28 PROCEDURE — 80053 COMPREHEN METABOLIC PANEL: CPT

## 2018-10-28 RX ORDER — 0.9 % SODIUM CHLORIDE 0.9 %
1000 INTRAVENOUS SOLUTION INTRAVENOUS ONCE
Status: COMPLETED | OUTPATIENT
Start: 2018-10-28 | End: 2018-10-28

## 2018-10-28 RX ADMIN — SODIUM CHLORIDE 1000 ML: 9 INJECTION, SOLUTION INTRAVENOUS at 22:33

## 2018-10-28 ASSESSMENT — ENCOUNTER SYMPTOMS
SINUS PRESSURE: 0
PHOTOPHOBIA: 0
BACK PAIN: 0
ABDOMINAL PAIN: 0
NAUSEA: 0
WHEEZING: 0
VOMITING: 0
SHORTNESS OF BREATH: 0
RHINORRHEA: 0
SORE THROAT: 0
COUGH: 0
CONSTIPATION: 0
APNEA: 0
DIARRHEA: 0
COLOR CHANGE: 0
ABDOMINAL DISTENTION: 0
EYE PAIN: 0

## 2018-10-29 NOTE — ED PROVIDER NOTES
breath. Skin: Skin is warm and dry. No rash noted. She is not diaphoretic. No erythema. No pallor. Psychiatric: She has a normal mood and affect. Her behavior is normal. Judgment and thought content normal.   Nursing note and vitals reviewed. DIAGNOSTIC RESULTS     EKG: All EKG's are interpreted by the Emergency Department Physician who either signs or Co-signs this chart in the absence of a cardiologist.        RADIOLOGY:   Non-plain film images such as CT, Ultrasound and MRI are read by the radiologist. Mckenzie Biswas radiographicimages are visualized and preliminarily interpreted by the emergency physician with the below findings:        Interpretation per the Radiologist below, if available at the time of this note:    No orders to display         ED BEDSIDE ULTRASOUND:   Performed by ED Physician - none    LABS:  Labs Reviewed   COMPREHENSIVE METABOLIC PANEL - Abnormal; Notable for the following:        Result Value    Sodium 145 (*)     Anion Gap 21 (*)     Glucose 122 (*)     CREATININE 0.94 (*)     Total Protein 8.4 (*)     Alb 5.5 (*)     Alkaline Phosphatase 138 (*)     ALT 42 (*)     All other components within normal limits   CBC WITH AUTO DIFFERENTIAL - Abnormal; Notable for the following:     MCH 31.9 (*)     All other components within normal limits   SALICYLATE LEVEL - Abnormal; Notable for the following:     Salicylate, Serum <2.8 (*)     All other components within normal limits   ETHANOL   MAGNESIUM   URINE RT REFLEX TO CULTURE   URINE DRUG SCREEN, COMPREHENSIVE   ACETAMINOPHEN LEVEL       All other labs were within normal range or not returned as of this dictation.     EMERGENCY DEPARTMENT COURSE and DIFFERENTIALDIAGNOSIS/MDM:   Vitals:    Vitals:    10/28/18 2158   BP: (!) 166/115   Pulse: 112   Resp: 18   Temp: 97.8 °F (36.6 °C)   TempSrc: Oral   SpO2: 97%   Weight: 140 lb (63.5 kg)   Height: 5' 6\" (1.676 m)           MDM  Number of Diagnoses or Management Options     Amount and/or Complexity of

## 2018-11-15 ENCOUNTER — OFFICE VISIT (OUTPATIENT)
Dept: FAMILY MEDICINE CLINIC | Age: 48
End: 2018-11-15
Payer: MEDICARE

## 2018-11-15 ENCOUNTER — HOSPITAL ENCOUNTER (OUTPATIENT)
Age: 48
Setting detail: SPECIMEN
Discharge: HOME | End: 2018-11-15
Payer: MEDICARE

## 2018-11-15 VITALS
RESPIRATION RATE: 18 BRPM | HEIGHT: 66 IN | BODY MASS INDEX: 23.78 KG/M2 | TEMPERATURE: 97.7 F | WEIGHT: 148 LBS | DIASTOLIC BLOOD PRESSURE: 64 MMHG | OXYGEN SATURATION: 97 % | HEART RATE: 68 BPM | SYSTOLIC BLOOD PRESSURE: 112 MMHG

## 2018-11-15 DIAGNOSIS — F41.9 ANXIETY: Primary | ICD-10-CM

## 2018-11-15 DIAGNOSIS — F17.200 CURRENT SMOKER: ICD-10-CM

## 2018-11-15 DIAGNOSIS — F10.10 ETOH ABUSE: ICD-10-CM

## 2018-11-15 DIAGNOSIS — Z79.899 HIGH RISK MEDICATION USE: ICD-10-CM

## 2018-11-15 DIAGNOSIS — R91.8 LUNG NODULES: ICD-10-CM

## 2018-11-15 DIAGNOSIS — F41.0 PANIC ATTACKS: ICD-10-CM

## 2018-11-15 PROCEDURE — 99214 OFFICE O/P EST MOD 30 MIN: CPT | Performed by: NURSE PRACTITIONER

## 2018-11-15 PROCEDURE — G8427 DOCREV CUR MEDS BY ELIG CLIN: HCPCS | Performed by: NURSE PRACTITIONER

## 2018-11-15 PROCEDURE — G8484 FLU IMMUNIZE NO ADMIN: HCPCS | Performed by: NURSE PRACTITIONER

## 2018-11-15 PROCEDURE — 80307 DRUG TEST PRSMV CHEM ANLYZR: CPT

## 2018-11-15 PROCEDURE — 4004F PT TOBACCO SCREEN RCVD TLK: CPT | Performed by: NURSE PRACTITIONER

## 2018-11-15 PROCEDURE — G8420 CALC BMI NORM PARAMETERS: HCPCS | Performed by: NURSE PRACTITIONER

## 2018-11-15 RX ORDER — CLONAZEPAM 0.5 MG/1
0.5 TABLET ORAL 2 TIMES DAILY PRN
Qty: 20 TABLET | Refills: 2 | Status: SHIPPED | OUTPATIENT
Start: 2018-11-15 | End: 2019-01-16 | Stop reason: SDUPTHER

## 2018-11-15 NOTE — PROGRESS NOTES
The patient presents today with anxiety. She has had issues with anxiety for several years. The patient has been treated in the past. Previous medications include klonopin. The patient does have a history of panic attacks. There are not suicidal thoughts or homicidal thoughts present. 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 She went to a few different hospitals to see who could help her with about a 3 day detox program and she ended up being treated at [facility].  She states that she has not really been craving alcohol but has been craving sweets since she is an sober. She states that unfortunately she is also noticed that she is very [place] strung\" without alcohol in her system. He continues to take Klonopin twice a day but has only been taking half tablet. She realized today that she only had 2 tablets left and that is why she called office to see if she can be worked into my schedule today. She is not currently in a long-term outpatient rehab program.  She states that she is doing okay overall. She has good supportive family and friends. Current smoker/lung nodules: She states that she hopes to stop smoking soon but is not quite ready yet. She was told a long time ago that she had nodules in one or both of her lungs and that she should have this followed up on but she has not done so. She states that she would like an order for CT scan to follow-up on this. No breathing difficulty or chronic cough. ROS: This patient reports no chest pain or pressure. There is no shortness of breath or cough. The patient reports no nausea or vomiting. There is no heartburn or indigestion. There is no diarrhea or constipation. No black, bloody, mucusy or tarry stool noticed. The patient reports no bloating and no change in appetite. There is no numbness, tingling or swelling in the extremities.         EXAM:  Constitutional Blood pressure 112/64, pulse 68, temperature

## 2018-11-18 LAB
6-ACETYLMORPHINE: NOT DETECTED
7-AMINOCLONAZEPAM: NOT DETECTED
ALPHA-OH-ALPRAZOLAM: NOT DETECTED
ALPRAZOLAM: NOT DETECTED
AMPHETAMINE: NOT DETECTED
BARBITURATES: NOT DETECTED
BENZOYLECGONINE: NOT DETECTED
BUPRENORPHINE: NOT DETECTED
CARISOPRODOL: NOT DETECTED
CLONAZEPAM: NOT DETECTED
CODEINE: NOT DETECTED
CREATININE URINE: 23.2 MG/DL (ref 20–400)
DIAZEPAM: NOT DETECTED
EER PAIN MGT DRUG PANEL, HIGH RES/EMIT U: NORMAL
ETHYL GLUCURONIDE: NOT DETECTED
FENTANYL: NOT DETECTED
HYDROCODONE: NOT DETECTED
HYDROMORPHONE: NOT DETECTED
LORAZEPAM: NOT DETECTED
MARIJUANA METABOLITE: NOT DETECTED
MDA: NOT DETECTED
MDEA: NOT DETECTED
MDMA URINE: NOT DETECTED
MEPERIDINE: NOT DETECTED
METHADONE: NOT DETECTED
METHAMPHETAMINE: NOT DETECTED
METHYLPHENIDATE: NOT DETECTED
MIDAZOLAM: NOT DETECTED
MORPHINE: NOT DETECTED
NORBUPRENORPHINE, FREE: NOT DETECTED
NORDIAZEPAM: NOT DETECTED
NORFENTANYL: NOT DETECTED
NORHYDROCODONE, URINE: NOT DETECTED
NOROXYCODONE: NOT DETECTED
NOROXYMORPHONE, URINE: NOT DETECTED
OXAZEPAM: NOT DETECTED
OXYCODONE: NOT DETECTED
OXYMORPHONE: NOT DETECTED
PAIN MANAGEMENT DRUG PANEL: NORMAL
PCP: NOT DETECTED
PHENTERMINE: NOT DETECTED
PROPOXYPHENE: NOT DETECTED
TAPENTADOL, URINE: NOT DETECTED
TAPENTADOL-O-SULFATE, URINE: NOT DETECTED
TEMAZEPAM: NOT DETECTED
TRAMADOL: NOT DETECTED
ZOLPIDEM: NOT DETECTED

## 2018-12-14 ENCOUNTER — TELEPHONE (OUTPATIENT)
Dept: FAMILY MEDICINE CLINIC | Age: 48
End: 2018-12-14

## 2018-12-14 ENCOUNTER — OFFICE VISIT (OUTPATIENT)
Dept: FAMILY MEDICINE CLINIC | Age: 48
End: 2018-12-14
Payer: MEDICARE

## 2018-12-14 VITALS
SYSTOLIC BLOOD PRESSURE: 126 MMHG | HEART RATE: 79 BPM | OXYGEN SATURATION: 98 % | WEIGHT: 152 LBS | RESPIRATION RATE: 20 BRPM | BODY MASS INDEX: 24.43 KG/M2 | TEMPERATURE: 97.2 F | HEIGHT: 66 IN | DIASTOLIC BLOOD PRESSURE: 70 MMHG

## 2018-12-14 DIAGNOSIS — H60.503 ACUTE OTITIS EXTERNA OF BOTH EARS, UNSPECIFIED TYPE: Primary | ICD-10-CM

## 2018-12-14 PROCEDURE — G8420 CALC BMI NORM PARAMETERS: HCPCS | Performed by: NURSE PRACTITIONER

## 2018-12-14 PROCEDURE — G8484 FLU IMMUNIZE NO ADMIN: HCPCS | Performed by: NURSE PRACTITIONER

## 2018-12-14 PROCEDURE — G8427 DOCREV CUR MEDS BY ELIG CLIN: HCPCS | Performed by: NURSE PRACTITIONER

## 2018-12-14 PROCEDURE — 4130F TOPICAL PREP RX AOE: CPT | Performed by: NURSE PRACTITIONER

## 2018-12-14 PROCEDURE — 4004F PT TOBACCO SCREEN RCVD TLK: CPT | Performed by: NURSE PRACTITIONER

## 2018-12-14 PROCEDURE — 99213 OFFICE O/P EST LOW 20 MIN: CPT | Performed by: NURSE PRACTITIONER

## 2019-01-13 ASSESSMENT — ENCOUNTER SYMPTOMS
ABDOMINAL PAIN: 0
COUGH: 0
BLOOD IN STOOL: 0
CHEST TIGHTNESS: 0
NAUSEA: 0
DIARRHEA: 0
VOMITING: 0
ABDOMINAL DISTENTION: 0
RHINORRHEA: 1
CONSTIPATION: 0
ANAL BLEEDING: 0
WHEEZING: 0
SORE THROAT: 0
SHORTNESS OF BREATH: 0

## 2019-01-16 DIAGNOSIS — F41.9 ANXIETY: ICD-10-CM

## 2019-01-16 DIAGNOSIS — F41.0 PANIC ATTACKS: ICD-10-CM

## 2019-01-17 RX ORDER — CLONAZEPAM 0.5 MG/1
0.5 TABLET ORAL 2 TIMES DAILY PRN
Qty: 20 TABLET | Refills: 0 | Status: SHIPPED | OUTPATIENT
Start: 2019-01-17 | End: 2019-02-01 | Stop reason: SDUPTHER

## 2019-02-01 DIAGNOSIS — F41.0 PANIC ATTACKS: ICD-10-CM

## 2019-02-01 DIAGNOSIS — F41.9 ANXIETY: ICD-10-CM

## 2019-02-01 RX ORDER — CLONAZEPAM 0.5 MG/1
0.5 TABLET ORAL 2 TIMES DAILY PRN
Qty: 20 TABLET | Refills: 0 | Status: SHIPPED | OUTPATIENT
Start: 2019-02-01 | End: 2019-02-14 | Stop reason: SDUPTHER

## 2019-02-14 ENCOUNTER — OFFICE VISIT (OUTPATIENT)
Dept: FAMILY MEDICINE CLINIC | Age: 49
End: 2019-02-14
Payer: MEDICARE

## 2019-02-14 ENCOUNTER — HOSPITAL ENCOUNTER (OUTPATIENT)
Age: 49
Setting detail: SPECIMEN
Discharge: HOME | End: 2019-02-14
Payer: MEDICARE

## 2019-02-14 VITALS
HEIGHT: 66 IN | OXYGEN SATURATION: 95 % | TEMPERATURE: 97.9 F | DIASTOLIC BLOOD PRESSURE: 68 MMHG | WEIGHT: 147 LBS | RESPIRATION RATE: 18 BRPM | BODY MASS INDEX: 23.63 KG/M2 | SYSTOLIC BLOOD PRESSURE: 118 MMHG | HEART RATE: 79 BPM

## 2019-02-14 DIAGNOSIS — Z79.899 HIGH RISK MEDICATION USE: ICD-10-CM

## 2019-02-14 DIAGNOSIS — F17.200 CURRENT SMOKER: ICD-10-CM

## 2019-02-14 DIAGNOSIS — F41.9 ANXIETY: Primary | ICD-10-CM

## 2019-02-14 DIAGNOSIS — F41.0 PANIC ATTACKS: ICD-10-CM

## 2019-02-14 DIAGNOSIS — R91.8 LUNG NODULES: ICD-10-CM

## 2019-02-14 DIAGNOSIS — J30.9 ALLERGIC RHINITIS, UNSPECIFIED SEASONALITY, UNSPECIFIED TRIGGER: ICD-10-CM

## 2019-02-14 PROCEDURE — G8420 CALC BMI NORM PARAMETERS: HCPCS | Performed by: NURSE PRACTITIONER

## 2019-02-14 PROCEDURE — G8484 FLU IMMUNIZE NO ADMIN: HCPCS | Performed by: NURSE PRACTITIONER

## 2019-02-14 PROCEDURE — 4004F PT TOBACCO SCREEN RCVD TLK: CPT | Performed by: NURSE PRACTITIONER

## 2019-02-14 PROCEDURE — G8427 DOCREV CUR MEDS BY ELIG CLIN: HCPCS | Performed by: NURSE PRACTITIONER

## 2019-02-14 PROCEDURE — 80307 DRUG TEST PRSMV CHEM ANLYZR: CPT

## 2019-02-14 PROCEDURE — 99214 OFFICE O/P EST MOD 30 MIN: CPT | Performed by: NURSE PRACTITIONER

## 2019-02-14 RX ORDER — CLONAZEPAM 0.5 MG/1
0.5 TABLET ORAL 2 TIMES DAILY PRN
Qty: 20 TABLET | Refills: 2 | Status: SHIPPED | OUTPATIENT
Start: 2019-02-14 | End: 2019-05-16 | Stop reason: SDUPTHER

## 2019-02-14 ASSESSMENT — PATIENT HEALTH QUESTIONNAIRE - PHQ9
1. LITTLE INTEREST OR PLEASURE IN DOING THINGS: 0
SUM OF ALL RESPONSES TO PHQ QUESTIONS 1-9: 0
2. FEELING DOWN, DEPRESSED OR HOPELESS: 0
SUM OF ALL RESPONSES TO PHQ9 QUESTIONS 1 & 2: 0
SUM OF ALL RESPONSES TO PHQ QUESTIONS 1-9: 0

## 2019-02-17 LAB
6-ACETYLMORPHINE: NOT DETECTED
7-AMINOCLONAZEPAM: PRESENT
ALPHA-OH-ALPRAZOLAM: NOT DETECTED
ALPRAZOLAM: NOT DETECTED
AMPHETAMINE: NOT DETECTED
BARBITURATES: NOT DETECTED
BENZOYLECGONINE: NOT DETECTED
BUPRENORPHINE: NOT DETECTED
CARISOPRODOL: NOT DETECTED
CLONAZEPAM: NOT DETECTED
CODEINE: NOT DETECTED
CREATININE URINE: 128.6 MG/DL (ref 20–400)
DIAZEPAM: NOT DETECTED
EER PAIN MGT DRUG PANEL, HIGH RES/EMIT U: NORMAL
ETHYL GLUCURONIDE: PRESENT
FENTANYL: NOT DETECTED
HYDROCODONE: NOT DETECTED
HYDROMORPHONE: NOT DETECTED
LORAZEPAM: NOT DETECTED
MARIJUANA METABOLITE: NOT DETECTED
MDA: NOT DETECTED
MDEA: NOT DETECTED
MDMA URINE: NOT DETECTED
MEPERIDINE: NOT DETECTED
METHADONE: NOT DETECTED
METHAMPHETAMINE: NOT DETECTED
METHYLPHENIDATE: NOT DETECTED
MIDAZOLAM: NOT DETECTED
MORPHINE: NOT DETECTED
NORBUPRENORPHINE, FREE: NOT DETECTED
NORDIAZEPAM: NOT DETECTED
NORFENTANYL: NOT DETECTED
NORHYDROCODONE, URINE: NOT DETECTED
NOROXYCODONE: NOT DETECTED
NOROXYMORPHONE, URINE: NOT DETECTED
OXAZEPAM: NOT DETECTED
OXYCODONE: NOT DETECTED
OXYMORPHONE: NOT DETECTED
PAIN MANAGEMENT DRUG PANEL: NORMAL
PCP: NOT DETECTED
PHENTERMINE: NOT DETECTED
PROPOXYPHENE: NOT DETECTED
TAPENTADOL, URINE: NOT DETECTED
TAPENTADOL-O-SULFATE, URINE: NOT DETECTED
TEMAZEPAM: NOT DETECTED
TRAMADOL: NOT DETECTED
ZOLPIDEM: NOT DETECTED

## 2019-05-14 DIAGNOSIS — F41.0 PANIC ATTACKS: ICD-10-CM

## 2019-05-14 DIAGNOSIS — F41.9 ANXIETY: ICD-10-CM

## 2019-05-14 RX ORDER — CLONAZEPAM 0.5 MG/1
0.5 TABLET ORAL 2 TIMES DAILY PRN
Qty: 20 TABLET | Refills: 2 | OUTPATIENT
Start: 2019-05-14 | End: 2019-08-12

## 2019-05-15 NOTE — TELEPHONE ENCOUNTER
Patient called said that she had asked the person that took the refill encounter if she could find out if she could see you for the medication refill because she has only a half a tablet left or make an appt but she never made her an appt to see you either way. cp

## 2019-05-16 RX ORDER — CLONAZEPAM 0.5 MG/1
0.5 TABLET ORAL 2 TIMES DAILY PRN
Qty: 10 TABLET | Refills: 0 | Status: SHIPPED | OUTPATIENT
Start: 2019-05-16 | End: 2019-08-13 | Stop reason: SDUPTHER

## 2019-05-23 ENCOUNTER — OFFICE VISIT (OUTPATIENT)
Dept: FAMILY MEDICINE CLINIC | Age: 49
End: 2019-05-23
Payer: MEDICARE

## 2019-05-23 VITALS
TEMPERATURE: 97.5 F | BODY MASS INDEX: 23.78 KG/M2 | OXYGEN SATURATION: 98 % | WEIGHT: 148 LBS | DIASTOLIC BLOOD PRESSURE: 70 MMHG | HEART RATE: 63 BPM | SYSTOLIC BLOOD PRESSURE: 110 MMHG | RESPIRATION RATE: 18 BRPM | HEIGHT: 66 IN

## 2019-05-23 DIAGNOSIS — F41.0 PANIC ATTACKS: ICD-10-CM

## 2019-05-23 DIAGNOSIS — F41.9 ANXIETY: ICD-10-CM

## 2019-05-23 PROCEDURE — 99213 OFFICE O/P EST LOW 20 MIN: CPT | Performed by: NURSE PRACTITIONER

## 2019-05-23 PROCEDURE — 4004F PT TOBACCO SCREEN RCVD TLK: CPT | Performed by: NURSE PRACTITIONER

## 2019-05-23 PROCEDURE — G8420 CALC BMI NORM PARAMETERS: HCPCS | Performed by: NURSE PRACTITIONER

## 2019-05-23 PROCEDURE — G8427 DOCREV CUR MEDS BY ELIG CLIN: HCPCS | Performed by: NURSE PRACTITIONER

## 2019-05-23 RX ORDER — CLONAZEPAM 0.5 MG/1
0.5 TABLET ORAL 2 TIMES DAILY PRN
Qty: 20 TABLET | Refills: 2 | Status: SHIPPED | OUTPATIENT
Start: 2019-05-23 | End: 2019-08-16 | Stop reason: SDUPTHER

## 2019-05-23 RX ORDER — CLONAZEPAM 0.5 MG/1
0.5 TABLET ORAL 2 TIMES DAILY PRN
Qty: 10 TABLET | Refills: 0 | Status: CANCELLED | OUTPATIENT
Start: 2019-05-23 | End: 2019-06-02

## 2019-05-23 ASSESSMENT — PATIENT HEALTH QUESTIONNAIRE - PHQ9
1. LITTLE INTEREST OR PLEASURE IN DOING THINGS: 0
SUM OF ALL RESPONSES TO PHQ9 QUESTIONS 1 & 2: 0
SUM OF ALL RESPONSES TO PHQ QUESTIONS 1-9: 0
2. FEELING DOWN, DEPRESSED OR HOPELESS: 0
SUM OF ALL RESPONSES TO PHQ QUESTIONS 1-9: 0

## 2019-05-23 NOTE — PROGRESS NOTES
The patient presents today with anxiety. She has had issues with anxiety for several years. The patient has been treated in the past. Previous medications include klonopin. The patient does have a history of panic attacks. There are not suicidal thoughts or homicidal thoughts present. She states that a lot time she takes a half tablet at a time. Sometimes can go weeks without taking 1 but other times has to take a couple of times per day. EXAM:  Constitutional Blood pressure 110/70, pulse 63, temperature 97.5 °F (36.4 °C), temperature source Temporal, resp. rate 18, height 5' 6\" (1.676 m), weight 148 lb (67.1 kg), last menstrual period 06/12/2017, SpO2 98 %, not currently breastfeeding. .  She has a normal affect, no acute distress, appears well developed and well nourished. Lungs are clear with equal breath sounds. Chest wall is not tender. Heart is in a regular rhythm with normal rate and no murmurs, rubs, or gallops. DIAGNOSIS:    Diagnosis Orders   1. Anxiety  clonazePAM (KLONOPIN) 0.5 MG tablet   2. Panic attacks  clonazePAM (KLONOPIN) 0.5 MG tablet         PLAN: Include orders in the DX section. Follow up: 3 months and as needed. Blood work one week prior as ordered. She has lab orders at home. She also plans to get CT done to follow up lung nodule history. This was ordered in November and she plans to call [facility] soon to set up appt. Refill of klonopin given. Controlled Substances Monitoring:     RX Monitoring 5/23/2019   Attestation The Prescription Monitoring Report for this patient was reviewed today. Chronic Pain Routine Monitoring No signs of potential drug abuse or diversion identified: otherwise, see note documentation   Chronic Pain > 80 MEDD Obtained or confirmed a written medication contract was on file.      Please note this report has been partially produced using speech recognition software and may cause contain errors related to that system including grammar, punctuation and spelling as well as words and phrases that may seem inappropriate. If there are questions or concerns please feel free to contact me to clarify.       Electronically signed by Constance Yeboah [id number], 2:07 PM 5/23/19

## 2019-06-04 ENCOUNTER — OFFICE VISIT (OUTPATIENT)
Dept: FAMILY MEDICINE CLINIC | Age: 49
End: 2019-06-04
Payer: MEDICARE

## 2019-06-04 VITALS
BODY MASS INDEX: 23.63 KG/M2 | WEIGHT: 147 LBS | HEIGHT: 66 IN | TEMPERATURE: 97 F | HEART RATE: 89 BPM | SYSTOLIC BLOOD PRESSURE: 110 MMHG | OXYGEN SATURATION: 97 % | DIASTOLIC BLOOD PRESSURE: 70 MMHG | RESPIRATION RATE: 18 BRPM

## 2019-06-04 DIAGNOSIS — H65.03 NON-RECURRENT ACUTE SEROUS OTITIS MEDIA OF BOTH EARS: ICD-10-CM

## 2019-06-04 DIAGNOSIS — K04.7 DENTAL INFECTION: Primary | ICD-10-CM

## 2019-06-04 PROCEDURE — 99213 OFFICE O/P EST LOW 20 MIN: CPT | Performed by: NURSE PRACTITIONER

## 2019-06-04 RX ORDER — CHLORHEXIDINE GLUCONATE 0.12 MG/ML
15 RINSE ORAL 2 TIMES DAILY
Qty: 420 ML | Refills: 0 | Status: SHIPPED | OUTPATIENT
Start: 2019-06-04 | End: 2019-06-18

## 2019-06-04 RX ORDER — AMOXICILLIN AND CLAVULANATE POTASSIUM 875; 125 MG/1; MG/1
1 TABLET, FILM COATED ORAL 2 TIMES DAILY
Qty: 14 TABLET | Refills: 0 | Status: SHIPPED | OUTPATIENT
Start: 2019-06-04 | End: 2019-06-11

## 2019-06-04 ASSESSMENT — ENCOUNTER SYMPTOMS
SINUS PRESSURE: 1
FACIAL SWELLING: 1

## 2019-06-04 NOTE — PATIENT INSTRUCTIONS
a time. Put a thin cloth between the ice and your skin. Do not use heat. · If your doctor prescribed antibiotics, take them as directed. Do not stop taking them just because you feel better. You need to take the full course of antibiotics. · Ask your doctor if you can take an over-the-counter pain medicine, such as acetaminophen (Tylenol), ibuprofen (Advil, Motrin), or naproxen (Aleve). Be safe with medicines. Read and follow all instructions on the label. · Avoid very hot, cold, or sweet foods and drinks if they increase your pain. · Rinse your mouth with warm salt water every 2 hours to help relieve pain and swelling. Mix 1 teaspoon of salt in 8 ounces of water. · Talk to your dentist about using special toothpaste for sensitive teeth. To reduce pain on contact with heat or cold or when brushing, brush with this toothpaste regularly or rub a small amount of the paste on the sensitive area with a clean finger 2 or 3 times a day. Floss gently between your teeth. · Do not smoke or use spit tobacco. Tobacco use can make gum problems worse, decreases your ability to fight infection in your gums, and delays healing. If you need help quitting, talk to your doctor about stop-smoking programs and medicines. These can increase your chances of quitting for good. When should you call for help? Call 911 anytime you think you may need emergency care. For example, call if:    · You have trouble breathing.    Call your dentist or doctor now or seek immediate medical care if:    · You have signs of infection, such as:  ? Increased pain, swelling, warmth, or redness. ? Red streaks leading from the area. ? Pus draining from the area. ? A fever.    Watch closely for changes in your health, and be sure to contact your doctor if:    · You do not get better as expected. Where can you learn more? Go to https://domingo.[employer]. org and sign in to your fitaborate account.  Enter [id number] in the [facility] box to learn more about \"Tooth and Gum Pain: Care Instructions. \"     If you do not have an account, please click on the \"Sign Up Now\" link. Current as of: October 3, 2018  Content Version: 12.0  © 6836-0553 [employer]. Care instructions adapted under license by [facility] ([facility]). If you have questions about a medical condition or this instruction, always ask your healthcare professional. [address] any warranty or liability for your use of this information.

## 2019-06-04 NOTE — PROGRESS NOTES
Subjective  Tiffanie Owen, 51 y.o. female presents today with:  Chief Complaint   Patient presents with    Jaw Pain     C/o jaw pain that is radiating up to here ear. Patient started two days ago and tried ibuprofen with no relief      Dental Pain    This is a new problem. The current episode started in the past 7 days. The problem occurs constantly. The problem has been gradually worsening. The pain is at a severity of 7/10. The pain is moderate. Associated symptoms include facial pain and sinus pressure. Pertinent negatives include no fever. She has tried NSAIDs for the symptoms. The treatment provided mild relief. Review of Systems   Constitutional: Negative for chills and fever. HENT: Positive for dental problem, ear pain (R), facial swelling and sinus pressure. Negative for ear discharge, mouth sores, nosebleeds, postnasal drip, rhinorrhea, sore throat, tinnitus and trouble swallowing. Respiratory: Negative for shortness of breath. Cardiovascular: Negative for chest pain and palpitations. Gastrointestinal: Negative for nausea and vomiting. Musculoskeletal: Negative for neck pain and neck stiffness. Objective  Vitals:    06/04/19 1714   BP: 110/70   Site: Right Upper Arm   Position: Sitting   Cuff Size: Small Adult   Pulse: 89   Resp: 18   Temp: 97 °F (36.1 °C)   TempSrc: Temporal   SpO2: 97%   Weight: 147 lb (66.7 kg)   Height: 5' 6\" (1.676 m)     Physical Exam   Constitutional: She is oriented to person, place, and time. She appears well-developed and well-nourished. Non-toxic appearance. No distress. HENT:   Head: Normocephalic and atraumatic. Right Ear: External ear and ear canal normal. There is tenderness. Tympanic membrane is not erythematous and not bulging. A middle ear effusion is present. Left Ear: External ear normal. No tenderness. A middle ear effusion is present. Nose: Nose normal.   Mouth/Throat: Uvula is midline and mucous membranes are normal. No oral lesions.  No trismus in the jaw. Abnormal dentition. Dental caries present. No dental abscesses or uvula swelling. Posterior oropharyngeal erythema present. No tonsillar abscesses. gingivial erythema R upper gums, multiple dental caries. Mild R facial swelling. Eyes: Pupils are equal, round, and reactive to light. Conjunctivae and EOM are normal.   Neck: Normal range of motion. Neck supple. Cardiovascular: Normal rate and regular rhythm. Pulmonary/Chest: Effort normal and breath sounds normal.   Musculoskeletal: Normal range of motion. Lymphadenopathy:     She has cervical adenopathy. Right cervical: Superficial cervical adenopathy present. Right: No supraclavicular adenopathy present. Left: No supraclavicular adenopathy present. Neurological: She is alert and oriented to person, place, and time. No sensory deficit. Gait normal.   Skin: Skin is warm and dry. Capillary refill takes less than 2 seconds. No rash noted. Psychiatric: She has a normal mood and affect. Her speech is normal and behavior is normal.   Vitals reviewed. POC Testing Today: None    Assessment & Plan   51 y.o. female presenting w/ R otalgia, mild R facial swelling, and dental pain. Multiple dental caries on exam and gingival erythema without overt evidence of dental abscess on exam. Pt states she has dental appointment upcoming. Diagnoses and all orders for this visit:    Dental infection  -     amoxicillin-clavulanate (AUGMENTIN) 875-125 MG per tablet; Take 1 tablet by mouth 2 times daily for 7 days  -     chlorhexidine (PERIDEX) 0.12 % solution;  Take 15 mLs by mouth 2 times daily for 14 days  - OTC acetaminophen/ibuprofen per package instructions as needed for pain/fever    Non-recurrent acute serous otitis media of both ears      - Discussed dx and natural course of serous otitis      - Supportive care discussed including avoidance of known allergens, saline nasal spray, OTC decongestants       - Return/see PCP if symptoms fail to improve as anticipated or worsen in any way    Antibiotic Instructions: Complete the full course of antibiotics as ordered. Take each dose with a small snack or meal to lessen potential GI upset. To prevent antibiotic resistance, please take medication as ordered and for the full duration even if you start to feel better. Consider intake of yogurt or probiotic during antibiotic use and for a few days after to help reduce the risk of developing a secondary infection. Separate the yogurt and antibiotic by at least 1 hour. Avoid alcohol while taking antibiotics. Return if symptoms worsen or fail to improve, for follow-up with your Primary Care Provider. Side effects and adverse effects of any medication prescribed today, as well as treatment plan/rationale, follow-up care, and result expectations have been discussed with the patient. Zeyad Jonnathan expresses understanding and wishes to proceed with the plan. Discussed signs and symptoms which require immediate follow-up in ED/call to 911. Understanding verbalized. I have reviewed and updated the electronic medical record.     Paulette Gould, APRN - CNP

## 2019-06-20 ASSESSMENT — ENCOUNTER SYMPTOMS
VOMITING: 0
TROUBLE SWALLOWING: 0
RHINORRHEA: 0
NAUSEA: 0
SHORTNESS OF BREATH: 0
SORE THROAT: 0

## 2019-08-13 ENCOUNTER — TELEPHONE (OUTPATIENT)
Dept: FAMILY MEDICINE CLINIC | Age: 49
End: 2019-08-13

## 2019-08-13 DIAGNOSIS — F41.0 PANIC ATTACKS: ICD-10-CM

## 2019-08-13 DIAGNOSIS — F41.9 ANXIETY: ICD-10-CM

## 2019-08-13 RX ORDER — CLONAZEPAM 0.5 MG/1
0.5 TABLET ORAL 2 TIMES DAILY PRN
Qty: 2 TABLET | Refills: 0 | Status: SHIPPED | OUTPATIENT
Start: 2019-08-13 | End: 2019-08-16 | Stop reason: ALTCHOICE

## 2019-08-16 ENCOUNTER — OFFICE VISIT (OUTPATIENT)
Dept: FAMILY MEDICINE CLINIC | Age: 49
End: 2019-08-16
Payer: MEDICARE

## 2019-08-16 VITALS
TEMPERATURE: 97.4 F | HEART RATE: 76 BPM | SYSTOLIC BLOOD PRESSURE: 118 MMHG | RESPIRATION RATE: 20 BRPM | HEIGHT: 66 IN | DIASTOLIC BLOOD PRESSURE: 70 MMHG | BODY MASS INDEX: 23.95 KG/M2 | OXYGEN SATURATION: 97 % | WEIGHT: 149 LBS

## 2019-08-16 DIAGNOSIS — F17.200 CURRENT SMOKER: ICD-10-CM

## 2019-08-16 DIAGNOSIS — Z00.00 ROUTINE GENERAL MEDICAL EXAMINATION AT A HEALTH CARE FACILITY: ICD-10-CM

## 2019-08-16 DIAGNOSIS — F41.0 PANIC ATTACKS: ICD-10-CM

## 2019-08-16 DIAGNOSIS — F41.9 ANXIETY: Primary | ICD-10-CM

## 2019-08-16 DIAGNOSIS — N39.3 STRESS INCONTINENCE: ICD-10-CM

## 2019-08-16 DIAGNOSIS — R91.8 LUNG NODULES: ICD-10-CM

## 2019-08-16 DIAGNOSIS — Z12.39 BREAST CANCER SCREENING: ICD-10-CM

## 2019-08-16 PROCEDURE — G8420 CALC BMI NORM PARAMETERS: HCPCS | Performed by: NURSE PRACTITIONER

## 2019-08-16 PROCEDURE — 4004F PT TOBACCO SCREEN RCVD TLK: CPT | Performed by: NURSE PRACTITIONER

## 2019-08-16 PROCEDURE — G8427 DOCREV CUR MEDS BY ELIG CLIN: HCPCS | Performed by: NURSE PRACTITIONER

## 2019-08-16 PROCEDURE — 99214 OFFICE O/P EST MOD 30 MIN: CPT | Performed by: NURSE PRACTITIONER

## 2019-08-16 RX ORDER — CLONAZEPAM 0.5 MG/1
0.5 TABLET ORAL 2 TIMES DAILY PRN
Qty: 20 TABLET | Refills: 2 | Status: SHIPPED | OUTPATIENT
Start: 2019-08-16 | End: 2019-10-25 | Stop reason: SDUPTHER

## 2019-08-16 ASSESSMENT — PATIENT HEALTH QUESTIONNAIRE - PHQ9
1. LITTLE INTEREST OR PLEASURE IN DOING THINGS: 0
SUM OF ALL RESPONSES TO PHQ QUESTIONS 1-9: 0
SUM OF ALL RESPONSES TO PHQ9 QUESTIONS 1 & 2: 0
SUM OF ALL RESPONSES TO PHQ QUESTIONS 1-9: 0
2. FEELING DOWN, DEPRESSED OR HOPELESS: 0

## 2019-08-16 NOTE — PROGRESS NOTES
The patient presents today with anxiety. She has had issues with anxiety for several years. The patient has been treated in the past. Previous medications include klonopin  The patient does have a history of panic attacks. There are not suicidal thoughts or homicidal thoughts present. Lung nodules/current smoker: She states that she is currently still smoking is not yet ready for cessation but does want to cut down again. 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 She denies any chronic cough but does have a history of lung nodules that was noted incidentally prior to her hysterectomy. She has not had a CT scan since that time. A test was ordered last year but she was never made aware if it was approved by insurance or not. Stress incontinence: She states that she has been having ongoing issues with leaking urine when she coughs or sneezes. She does not really have any increased urinary frequency or urgency but states that she does leak urine when there is increased pressure. It has been worse since her hysterectomy. No burning with urination. No flank pain. No change in urine color or odor. ROS: This patient reports no chest pain or pressure. There is no shortness of breath or cough. The patient reports no nausea or vomiting. There is no heartburn or indigestion. There is no diarrhea or constipation. No black, bloody, mucusy or tarry stool noticed. The patient reports no bloating and no change in appetite. There is no numbness, tingling or swelling in the extremities. EXAM:  Constitutional Blood pressure 118/70, pulse 76, temperature 97.4 °F (36.3 °C), temperature source Temporal, resp. rate 20, height 5' 6\" (1.676 m), weight 149 lb (67.6 kg), last menstrual period 06/12/2017, SpO2 97 %, not currently breastfeeding.   She has a normal affect, no acute distress, appears well developed and well

## 2019-09-03 ENCOUNTER — HOSPITAL ENCOUNTER (OUTPATIENT)
Dept: WOMENS IMAGING | Age: 49
Discharge: HOME | End: 2019-09-05
Payer: MEDICARE

## 2019-09-03 ENCOUNTER — HOSPITAL ENCOUNTER (OUTPATIENT)
Dept: CT IMAGING | Age: 49
Discharge: HOME | End: 2019-09-05
Payer: MEDICARE

## 2019-09-03 DIAGNOSIS — Z12.39 BREAST CANCER SCREENING: ICD-10-CM

## 2019-09-03 DIAGNOSIS — R91.8 LUNG NODULES: ICD-10-CM

## 2019-09-03 PROCEDURE — 77067 SCR MAMMO BI INCL CAD: CPT

## 2019-09-03 PROCEDURE — 6360000004 HC RX CONTRAST MEDICATION: Performed by: NURSE PRACTITIONER

## 2019-09-03 PROCEDURE — 71260 CT THORAX DX C+: CPT

## 2019-09-03 RX ADMIN — IOPAMIDOL 100 ML: 612 INJECTION, SOLUTION INTRAVENOUS at 08:35

## 2019-09-06 ENCOUNTER — TELEPHONE (OUTPATIENT)
Dept: FAMILY MEDICINE CLINIC | Age: 49
End: 2019-09-06

## 2019-09-06 DIAGNOSIS — R93.89 ABNORMAL CXR: ICD-10-CM

## 2019-09-06 DIAGNOSIS — F17.200 CURRENT SMOKER: Primary | ICD-10-CM

## 2019-09-17 ENCOUNTER — OFFICE VISIT (OUTPATIENT)
Dept: FAMILY MEDICINE CLINIC | Age: 49
End: 2019-09-17
Payer: MEDICARE

## 2019-09-17 VITALS
SYSTOLIC BLOOD PRESSURE: 110 MMHG | DIASTOLIC BLOOD PRESSURE: 74 MMHG | TEMPERATURE: 97 F | RESPIRATION RATE: 18 BRPM | HEART RATE: 82 BPM | HEIGHT: 66 IN | WEIGHT: 146 LBS | OXYGEN SATURATION: 97 % | BODY MASS INDEX: 23.46 KG/M2

## 2019-09-17 DIAGNOSIS — M54.6 ACUTE MIDLINE THORACIC BACK PAIN: ICD-10-CM

## 2019-09-17 DIAGNOSIS — R07.89 CHEST DISCOMFORT: Primary | ICD-10-CM

## 2019-09-17 PROCEDURE — G8420 CALC BMI NORM PARAMETERS: HCPCS | Performed by: PHYSICIAN ASSISTANT

## 2019-09-17 PROCEDURE — 4004F PT TOBACCO SCREEN RCVD TLK: CPT | Performed by: PHYSICIAN ASSISTANT

## 2019-09-17 PROCEDURE — G8427 DOCREV CUR MEDS BY ELIG CLIN: HCPCS | Performed by: PHYSICIAN ASSISTANT

## 2019-09-17 PROCEDURE — 93000 ELECTROCARDIOGRAM COMPLETE: CPT | Performed by: PHYSICIAN ASSISTANT

## 2019-09-17 PROCEDURE — 99213 OFFICE O/P EST LOW 20 MIN: CPT | Performed by: PHYSICIAN ASSISTANT

## 2019-09-17 RX ORDER — TIZANIDINE 2 MG/1
2 TABLET ORAL NIGHTLY PRN
Qty: 10 TABLET | Refills: 0 | Status: SHIPPED | OUTPATIENT
Start: 2019-09-17 | End: 2019-10-25 | Stop reason: ALTCHOICE

## 2019-09-17 ASSESSMENT — ENCOUNTER SYMPTOMS
BACK PAIN: 1
ABDOMINAL PAIN: 0
COLOR CHANGE: 0
WHEEZING: 0
BOWEL INCONTINENCE: 0
COUGH: 0
SHORTNESS OF BREATH: 0

## 2019-10-25 ENCOUNTER — OFFICE VISIT (OUTPATIENT)
Dept: FAMILY MEDICINE CLINIC | Age: 49
End: 2019-10-25
Payer: MEDICARE

## 2019-10-25 VITALS
SYSTOLIC BLOOD PRESSURE: 110 MMHG | RESPIRATION RATE: 18 BRPM | TEMPERATURE: 97.7 F | DIASTOLIC BLOOD PRESSURE: 60 MMHG | BODY MASS INDEX: 23.95 KG/M2 | HEART RATE: 85 BPM | OXYGEN SATURATION: 94 % | HEIGHT: 66 IN | WEIGHT: 149 LBS

## 2019-10-25 DIAGNOSIS — J30.9 ALLERGIC RHINITIS, UNSPECIFIED SEASONALITY, UNSPECIFIED TRIGGER: ICD-10-CM

## 2019-10-25 DIAGNOSIS — F41.9 ANXIETY: Primary | ICD-10-CM

## 2019-10-25 DIAGNOSIS — R93.89 ABNORMAL CXR: ICD-10-CM

## 2019-10-25 DIAGNOSIS — F41.0 PANIC ATTACKS: ICD-10-CM

## 2019-10-25 DIAGNOSIS — F17.200 CURRENT SMOKER: ICD-10-CM

## 2019-10-25 DIAGNOSIS — R91.8 LUNG NODULES: ICD-10-CM

## 2019-10-25 PROCEDURE — G8420 CALC BMI NORM PARAMETERS: HCPCS | Performed by: NURSE PRACTITIONER

## 2019-10-25 PROCEDURE — 99214 OFFICE O/P EST MOD 30 MIN: CPT | Performed by: NURSE PRACTITIONER

## 2019-10-25 PROCEDURE — G8427 DOCREV CUR MEDS BY ELIG CLIN: HCPCS | Performed by: NURSE PRACTITIONER

## 2019-10-25 PROCEDURE — G8484 FLU IMMUNIZE NO ADMIN: HCPCS | Performed by: NURSE PRACTITIONER

## 2019-10-25 PROCEDURE — 4004F PT TOBACCO SCREEN RCVD TLK: CPT | Performed by: NURSE PRACTITIONER

## 2019-10-25 RX ORDER — CLONAZEPAM 0.5 MG/1
0.5 TABLET ORAL 2 TIMES DAILY PRN
Qty: 20 TABLET | Refills: 2 | Status: SHIPPED | OUTPATIENT
Start: 2019-10-25 | End: 2019-12-30 | Stop reason: SDUPTHER

## 2019-10-25 ASSESSMENT — PATIENT HEALTH QUESTIONNAIRE - PHQ9
2. FEELING DOWN, DEPRESSED OR HOPELESS: 0
SUM OF ALL RESPONSES TO PHQ QUESTIONS 1-9: 0
1. LITTLE INTEREST OR PLEASURE IN DOING THINGS: 0
SUM OF ALL RESPONSES TO PHQ QUESTIONS 1-9: 0
SUM OF ALL RESPONSES TO PHQ9 QUESTIONS 1 & 2: 0

## 2019-11-06 ENCOUNTER — TELEPHONE (OUTPATIENT)
Dept: FAMILY MEDICINE CLINIC | Age: 49
End: 2019-11-06

## 2019-11-09 ENCOUNTER — OFFICE VISIT (OUTPATIENT)
Dept: FAMILY MEDICINE CLINIC | Age: 49
End: 2019-11-09
Payer: MEDICARE

## 2019-11-09 VITALS
OXYGEN SATURATION: 97 % | TEMPERATURE: 97 F | HEIGHT: 66 IN | BODY MASS INDEX: 24.11 KG/M2 | RESPIRATION RATE: 16 BRPM | HEART RATE: 85 BPM | WEIGHT: 150 LBS | SYSTOLIC BLOOD PRESSURE: 128 MMHG | DIASTOLIC BLOOD PRESSURE: 78 MMHG

## 2019-11-09 DIAGNOSIS — J04.0 LARYNGITIS: Primary | ICD-10-CM

## 2019-11-09 PROCEDURE — G8420 CALC BMI NORM PARAMETERS: HCPCS | Performed by: PHYSICIAN ASSISTANT

## 2019-11-09 PROCEDURE — 99213 OFFICE O/P EST LOW 20 MIN: CPT | Performed by: PHYSICIAN ASSISTANT

## 2019-11-09 PROCEDURE — G8484 FLU IMMUNIZE NO ADMIN: HCPCS | Performed by: PHYSICIAN ASSISTANT

## 2019-11-09 PROCEDURE — G8427 DOCREV CUR MEDS BY ELIG CLIN: HCPCS | Performed by: PHYSICIAN ASSISTANT

## 2019-11-09 PROCEDURE — 4004F PT TOBACCO SCREEN RCVD TLK: CPT | Performed by: PHYSICIAN ASSISTANT

## 2019-11-29 ENCOUNTER — OFFICE VISIT (OUTPATIENT)
Dept: FAMILY MEDICINE CLINIC | Age: 49
End: 2019-11-29
Payer: MEDICARE

## 2019-11-29 VITALS
HEART RATE: 66 BPM | RESPIRATION RATE: 18 BRPM | HEIGHT: 66 IN | OXYGEN SATURATION: 97 % | WEIGHT: 147 LBS | TEMPERATURE: 97.7 F | DIASTOLIC BLOOD PRESSURE: 70 MMHG | SYSTOLIC BLOOD PRESSURE: 110 MMHG | BODY MASS INDEX: 23.63 KG/M2

## 2019-11-29 DIAGNOSIS — B96.89 ACUTE BACTERIAL SINUSITIS: Primary | ICD-10-CM

## 2019-11-29 DIAGNOSIS — J01.90 ACUTE BACTERIAL SINUSITIS: Primary | ICD-10-CM

## 2019-11-29 PROCEDURE — G8420 CALC BMI NORM PARAMETERS: HCPCS | Performed by: PHYSICIAN ASSISTANT

## 2019-11-29 PROCEDURE — 99213 OFFICE O/P EST LOW 20 MIN: CPT | Performed by: PHYSICIAN ASSISTANT

## 2019-11-29 PROCEDURE — G8484 FLU IMMUNIZE NO ADMIN: HCPCS | Performed by: PHYSICIAN ASSISTANT

## 2019-11-29 PROCEDURE — G8427 DOCREV CUR MEDS BY ELIG CLIN: HCPCS | Performed by: PHYSICIAN ASSISTANT

## 2019-11-29 PROCEDURE — 4004F PT TOBACCO SCREEN RCVD TLK: CPT | Performed by: PHYSICIAN ASSISTANT

## 2019-11-29 RX ORDER — CEFDINIR 300 MG/1
300 CAPSULE ORAL 2 TIMES DAILY
Qty: 20 CAPSULE | Refills: 0 | Status: SHIPPED | OUTPATIENT
Start: 2019-11-29 | End: 2019-12-04

## 2019-11-29 RX ORDER — AZITHROMYCIN 250 MG/1
TABLET, FILM COATED ORAL
Qty: 1 PACKET | Refills: 0 | Status: CANCELLED | OUTPATIENT
Start: 2019-11-29

## 2019-11-29 ASSESSMENT — ENCOUNTER SYMPTOMS
ABDOMINAL PAIN: 0
SHORTNESS OF BREATH: 0
HOARSE VOICE: 0
SORE THROAT: 1
COUGH: 1
SINUS PRESSURE: 1
SWOLLEN GLANDS: 0

## 2019-11-29 ASSESSMENT — PATIENT HEALTH QUESTIONNAIRE - PHQ9
SUM OF ALL RESPONSES TO PHQ9 QUESTIONS 1 & 2: 0
2. FEELING DOWN, DEPRESSED OR HOPELESS: 0
SUM OF ALL RESPONSES TO PHQ QUESTIONS 1-9: 0
SUM OF ALL RESPONSES TO PHQ QUESTIONS 1-9: 0
1. LITTLE INTEREST OR PLEASURE IN DOING THINGS: 0

## 2019-12-02 ENCOUNTER — TELEPHONE (OUTPATIENT)
Dept: FAMILY MEDICINE CLINIC | Age: 49
End: 2019-12-02

## 2019-12-04 ENCOUNTER — OFFICE VISIT (OUTPATIENT)
Dept: FAMILY MEDICINE CLINIC | Age: 49
End: 2019-12-04
Payer: MEDICARE

## 2019-12-04 VITALS
OXYGEN SATURATION: 97 % | WEIGHT: 147 LBS | DIASTOLIC BLOOD PRESSURE: 78 MMHG | HEART RATE: 73 BPM | HEIGHT: 66 IN | RESPIRATION RATE: 16 BRPM | BODY MASS INDEX: 23.63 KG/M2 | TEMPERATURE: 97.8 F | SYSTOLIC BLOOD PRESSURE: 122 MMHG

## 2019-12-04 DIAGNOSIS — J20.9 ACUTE BRONCHITIS, UNSPECIFIED ORGANISM: Primary | ICD-10-CM

## 2019-12-04 PROCEDURE — G8484 FLU IMMUNIZE NO ADMIN: HCPCS | Performed by: PHYSICIAN ASSISTANT

## 2019-12-04 PROCEDURE — 99213 OFFICE O/P EST LOW 20 MIN: CPT | Performed by: PHYSICIAN ASSISTANT

## 2019-12-04 PROCEDURE — 4004F PT TOBACCO SCREEN RCVD TLK: CPT | Performed by: PHYSICIAN ASSISTANT

## 2019-12-04 PROCEDURE — G8428 CUR MEDS NOT DOCUMENT: HCPCS | Performed by: PHYSICIAN ASSISTANT

## 2019-12-04 PROCEDURE — G8420 CALC BMI NORM PARAMETERS: HCPCS | Performed by: PHYSICIAN ASSISTANT

## 2019-12-04 RX ORDER — LEVOFLOXACIN 500 MG/1
500 TABLET, FILM COATED ORAL DAILY
Qty: 10 TABLET | Refills: 0 | Status: CANCELLED | OUTPATIENT
Start: 2019-12-04 | End: 2019-12-14

## 2019-12-04 RX ORDER — AZITHROMYCIN 250 MG/1
TABLET, FILM COATED ORAL
Qty: 1 PACKET | Refills: 0 | Status: SHIPPED | OUTPATIENT
Start: 2019-12-04 | End: 2020-01-10 | Stop reason: ALTCHOICE

## 2019-12-04 RX ORDER — DEXTROMETHORPHAN HYDROBROMIDE AND PROMETHAZINE HYDROCHLORIDE 15; 6.25 MG/5ML; MG/5ML
5 SYRUP ORAL 4 TIMES DAILY PRN
Qty: 200 ML | Refills: 0 | Status: SHIPPED | OUTPATIENT
Start: 2019-12-04 | End: 2020-01-10 | Stop reason: ALTCHOICE

## 2019-12-04 RX ORDER — PREDNISONE 20 MG/1
20 TABLET ORAL 2 TIMES DAILY
Qty: 10 TABLET | Refills: 0 | Status: SHIPPED | OUTPATIENT
Start: 2019-12-04 | End: 2019-12-09

## 2019-12-04 ASSESSMENT — ENCOUNTER SYMPTOMS
SINUS PRESSURE: 0
NAUSEA: 0
SHORTNESS OF BREATH: 0
COUGH: 1
SORE THROAT: 0
WHEEZING: 0
CHOKING: 0

## 2019-12-30 DIAGNOSIS — F41.0 PANIC ATTACKS: ICD-10-CM

## 2019-12-30 DIAGNOSIS — F41.9 ANXIETY: ICD-10-CM

## 2019-12-30 RX ORDER — CLONAZEPAM 0.5 MG/1
0.5 TABLET ORAL 2 TIMES DAILY PRN
Qty: 20 TABLET | Refills: 0 | Status: SHIPPED | OUTPATIENT
Start: 2019-12-30 | End: 2020-01-10 | Stop reason: SDUPTHER

## 2020-01-10 ENCOUNTER — OFFICE VISIT (OUTPATIENT)
Dept: FAMILY MEDICINE CLINIC | Age: 50
End: 2020-01-10
Payer: MEDICARE

## 2020-01-10 ENCOUNTER — HOSPITAL ENCOUNTER (OUTPATIENT)
Age: 50
Setting detail: SPECIMEN
Discharge: HOME | End: 2020-01-10
Payer: MEDICARE

## 2020-01-10 VITALS
DIASTOLIC BLOOD PRESSURE: 80 MMHG | WEIGHT: 148 LBS | HEIGHT: 66 IN | RESPIRATION RATE: 18 BRPM | SYSTOLIC BLOOD PRESSURE: 130 MMHG | TEMPERATURE: 96.7 F | OXYGEN SATURATION: 98 % | BODY MASS INDEX: 23.78 KG/M2 | HEART RATE: 67 BPM

## 2020-01-10 PROCEDURE — 4004F PT TOBACCO SCREEN RCVD TLK: CPT | Performed by: NURSE PRACTITIONER

## 2020-01-10 PROCEDURE — G8420 CALC BMI NORM PARAMETERS: HCPCS | Performed by: NURSE PRACTITIONER

## 2020-01-10 PROCEDURE — G8427 DOCREV CUR MEDS BY ELIG CLIN: HCPCS | Performed by: NURSE PRACTITIONER

## 2020-01-10 PROCEDURE — 99214 OFFICE O/P EST MOD 30 MIN: CPT | Performed by: NURSE PRACTITIONER

## 2020-01-10 PROCEDURE — G8484 FLU IMMUNIZE NO ADMIN: HCPCS | Performed by: NURSE PRACTITIONER

## 2020-01-10 PROCEDURE — 80307 DRUG TEST PRSMV CHEM ANLYZR: CPT

## 2020-01-10 RX ORDER — CLONAZEPAM 0.5 MG/1
0.5 TABLET ORAL 2 TIMES DAILY PRN
Qty: 20 TABLET | Refills: 2 | Status: SHIPPED | OUTPATIENT
Start: 2020-01-10 | End: 2020-03-24 | Stop reason: SDUPTHER

## 2020-01-10 RX ORDER — FLUTICASONE PROPIONATE 50 MCG
1 SPRAY, SUSPENSION (ML) NASAL DAILY
Qty: 1 BOTTLE | Refills: 0 | Status: SHIPPED | OUTPATIENT
Start: 2020-01-10 | End: 2020-03-24 | Stop reason: SDUPTHER

## 2020-01-10 NOTE — PROGRESS NOTES
normal affect, no acute distress, appears well developed and well nourished. Lungs are clear with equal breath sounds. Chest wall is not tender. Heart is in a regular rhythm with normal rate and no murmurs, rubs, or gallops. HEENT:  Pupils are equal and reactive, TMs and ear canals are normal. No sinus tenderness or drainage. Pharynx is clear and mucous membranes are moist.  Neck is supple without mass, adenopathy or bruit. Extremities are without edema. DIAGNOSIS:    Diagnosis Orders   1. Anxiety  clonazePAM (KLONOPIN) 0.5 MG tablet   2. Panic attacks  clonazePAM (KLONOPIN) 0.5 MG tablet   3. Acute non-recurrent pansinusitis  fluticasone (FLONASE) 50 MCG/ACT nasal spray   4. Allergic rhinitis, unspecified seasonality, unspecified trigger     5. Current smoker     6. Abnormal CXR     7. Encounter for smoking cessation counseling     8. High risk medication use  Pain Management Drug Screen         PLAN: Include orders in the DX section. Follow up: 2 weeks and as needed. Blood work one week prior as ordered. 1.  2.  Symptoms of anxiety are intermittent depending on what is going on that day. She does not take the Klonopin every day. He continues to provide help when she is unable to de-escalate her anxiety symptoms with different techniques. 3.  4.  No evidence of bacterial infection today but do encourage her to continue taking Flonase more routinely however. Prescription sent to local pharmacy. 5.  6.  7.  She is encouraged with her reduction in cigarette consumption and discussed continuing to decrease the amount that she smokes per day. She will try nicotine patches over-the-counter to see if this helps with her cravings. She plans to have pulmonary function testing done in the future. She is worried about potential starting of COPD based on history of chest x-ray findings.       Controlled Substance Monitoring:    Acute and Chronic Pain Monitoring:   RX Monitoring 1/10/2020

## 2020-01-16 LAB
6-ACETYLMORPHINE: NOT DETECTED
7-AMINOCLONAZEPAM: PRESENT
ALPHA-OH-ALPRAZOLAM: NOT DETECTED
ALPRAZOLAM: NOT DETECTED
AMPHETAMINE: NOT DETECTED
BARBITURATES: NOT DETECTED
BENZOYLECGONINE: NOT DETECTED
BUPRENORPHINE: NOT DETECTED
CARISOPRODOL: NOT DETECTED
CLONAZEPAM: NOT DETECTED
CODEINE: NOT DETECTED
CREATININE URINE: 91.2 MG/DL (ref 20–400)
DIAZEPAM: NOT DETECTED
EER PAIN MGT DRUG PANEL, HIGH RES/EMIT U: NORMAL
ETHYL GLUCURONIDE: PRESENT
FENTANYL: NOT DETECTED
HYDROCODONE: NOT DETECTED
HYDROMORPHONE: NOT DETECTED
LORAZEPAM: NOT DETECTED
MARIJUANA METABOLITE: NOT DETECTED
MDA: NOT DETECTED
MDEA: NOT DETECTED
MDMA URINE: NOT DETECTED
MEPERIDINE: NOT DETECTED
METHADONE: NOT DETECTED
METHAMPHETAMINE: NOT DETECTED
METHYLPHENIDATE: NOT DETECTED
MIDAZOLAM: NOT DETECTED
MORPHINE: NOT DETECTED
NORBUPRENORPHINE, FREE: NOT DETECTED
NORDIAZEPAM: NOT DETECTED
NORFENTANYL: NOT DETECTED
NORHYDROCODONE, URINE: NOT DETECTED
NOROXYCODONE: NOT DETECTED
NOROXYMORPHONE, URINE: NOT DETECTED
OXAZEPAM: NOT DETECTED
OXYCODONE: NOT DETECTED
OXYMORPHONE: NOT DETECTED
PAIN MANAGEMENT DRUG PANEL: NORMAL
PCP: NOT DETECTED
PHENTERMINE: NOT DETECTED
PROPOXYPHENE: NOT DETECTED
TAPENTADOL, URINE: NOT DETECTED
TAPENTADOL-O-SULFATE, URINE: NOT DETECTED
TEMAZEPAM: NOT DETECTED
TRAMADOL: NOT DETECTED
ZOLPIDEM: NOT DETECTED

## 2020-03-24 RX ORDER — CLONAZEPAM 0.5 MG/1
0.5 TABLET ORAL 2 TIMES DAILY PRN
Qty: 20 TABLET | Refills: 2 | Status: SHIPPED | OUTPATIENT
Start: 2020-03-24 | End: 2020-06-18 | Stop reason: SDUPTHER

## 2020-03-24 RX ORDER — FLUTICASONE PROPIONATE 50 MCG
1 SPRAY, SUSPENSION (ML) NASAL DAILY
Qty: 1 BOTTLE | Refills: 0 | Status: SHIPPED | OUTPATIENT
Start: 2020-03-24 | End: 2020-07-28

## 2020-06-17 ENCOUNTER — TELEPHONE (OUTPATIENT)
Dept: FAMILY MEDICINE CLINIC | Age: 50
End: 2020-06-17

## 2020-06-17 NOTE — TELEPHONE ENCOUNTER
Patient requesting medication refill. Please approve or deny this request.    Patient made an appointment and would like enough medication to get her to her appointment on 7/23/2020. Rx requested:  Requested Prescriptions     Pending Prescriptions Disp Refills    clonazePAM (KLONOPIN) 0.5 MG tablet 20 tablet 2     Sig: Take 1 tablet by mouth 2 times daily as needed for Anxiety for up to 30 days.          Last Office Visit:   1/10/2020      Next Visit Date:  Future Appointments   Date Time Provider Sabrina Gonzalez   7/23/2020  7:10 AM OANH Martinez - CNP [facility] [address]

## 2020-06-18 ENCOUNTER — VIRTUAL VISIT (OUTPATIENT)
Dept: FAMILY MEDICINE CLINIC | Age: 50
End: 2020-06-18
Payer: MEDICARE

## 2020-06-18 PROCEDURE — G8428 CUR MEDS NOT DOCUMENT: HCPCS | Performed by: NURSE PRACTITIONER

## 2020-06-18 PROCEDURE — 99213 OFFICE O/P EST LOW 20 MIN: CPT | Performed by: NURSE PRACTITIONER

## 2020-06-18 RX ORDER — CLONAZEPAM 0.5 MG/1
0.5 TABLET ORAL 2 TIMES DAILY PRN
Qty: 20 TABLET | Refills: 2 | Status: SHIPPED | OUTPATIENT
Start: 2020-06-18 | End: 2020-08-13 | Stop reason: SDUPTHER

## 2020-06-18 RX ORDER — CLONAZEPAM 0.5 MG/1
0.5 TABLET ORAL 2 TIMES DAILY PRN
Qty: 20 TABLET | Refills: 2 | OUTPATIENT
Start: 2020-06-18 | End: 2020-07-18

## 2020-06-18 NOTE — TELEPHONE ENCOUNTER
1st attempt to reach patient. Called patient @[phone]  and left message on machine for patient to return call during normal business hours of 8:30 AM and 5 PM @ [phone] option 3..

## 2020-06-18 NOTE — TELEPHONE ENCOUNTER
Patient always has to be seen within 90 days for controlled substance refill. I can do a video visit.  Please find out when she is available and I can check my schedule for an area to put her

## 2020-06-18 NOTE — PROGRESS NOTES
2020    TELEHEALTH EVALUATION -- Audio/Visual (During AGGNW-35 public health emergency)    Due to [place] 19 outbreak, patient's office visit was converted to a virtual visit. Patient was contacted and agreed to proceed with a virtual visit via Doxy. me  The risks and benefits of converting to a virtual visit were discussed in light of the current infectious disease epidemic. Patient also understood that insurance coverage and co-pays are up to their individual insurance plans. Gianfranco Dennison is a 53 y.o. female being evaluated by a Virtual Visit (video visit) encounter to address concerns as mentioned above. A caregiver was present when appropriate. Due to this being a TeleHealth encounter (During LOXDK-54 public health emergency), evaluation of the following organ systems was limited: Vitals/Constitutional/EENT/Resp/CV/GI//MS/Neuro/Skin/Heme-Lymph-Imm. Pursuant to the emergency declaration under the [address] [place] [address] and the [employer] and [employer] Act, this Virtual Visit was conducted with patient's (and/or legal guardian's) consent, to reduce the patient's risk of exposure to COVID-19 and provide necessary medical care. The patient (and/or legal guardian) has also been advised to contact this office for worsening conditions or problems, and seek emergency medical treatment and/or call 911 if deemed necessary. Patient identification was verified at the start of the visit: Yes    Total time spent for this encounter: Not billed by time    Services were provided through a video synchronous discussion virtually to substitute for in-person clinic visit. Patient and provider were located at their individual homes. The patient is talking with me virtually from her home and I am located at my office in [facility].      HPI:    Gianfranco Dennison (:  1970) has requested an audio/video evaluation for the MG tablet   2. Panic attacks  clonazePAM (KLONOPIN) 0.5 MG tablet       Return in about 3 months (around 2020) for med review. Klonopin refill given to be taken sparingly for situational symptoms of anxiety. Medication has been effective and there are no side effects reported. She will plan to have blood work done prior to her next visit. She is encouraged to get done before mid August as her previous orders will  at that time. Controlled Substance Monitoring:    Acute and Chronic Pain Monitoring:   RX Monitoring 2020   Attestation -   Periodic Controlled Substance Monitoring Possible medication side effects, risk of tolerance/dependence & alternative treatments discussed. ;No signs of potential drug abuse or diversion identified. ;Assessed functional status. Chronic Pain > 80 MEDD -       Please note this report has been partially produced using speech recognition software and may cause contain errors related to that system including grammar, punctuation and spelling as well as words and phrases that may seem inappropriate. If there are questions or concerns please feel free to contact me to clarify. An  electronic signature was used to authenticate this note. --OANH Lim - CNP on 2020 at 12:59 PM        Pursuant to the emergency declaration under the [facility] waiver authority and the [employer] and [employer] Act, this Virtual  Visit was conducted, with patient's consent, to reduce the patient's risk of exposure to COVID-19 and provide continuity of care for an established patient. Services were provided through a video synchronous discussion virtually to substitute for in-person clinic visit.

## 2020-06-18 NOTE — TELEPHONE ENCOUNTER
Please double book my 2 pm but I can probably do her visit a bit before that. I will try to do it around 1. Go ahead and put her on my schedule today.

## 2020-07-28 RX ORDER — FLUTICASONE PROPIONATE 50 MCG
SPRAY, SUSPENSION (ML) NASAL
Qty: 16 G | Refills: 0 | Status: SHIPPED | OUTPATIENT
Start: 2020-07-28 | End: 2020-12-31 | Stop reason: SDUPTHER

## 2020-07-28 NOTE — TELEPHONE ENCOUNTER
pharmacy requesting medication refill.  Please approve or deny this request.    Rx requested:  Requested Prescriptions     Pending Prescriptions Disp Refills    fluticasone (FLONASE) 50 MCG/ACT nasal spray [Pharmacy Med Name: fluticasone propionate 50 mcg/actuation nasal spray,suspension] 16 g 0     Sig: use 1 spray by nasal route daily         Last Office Visit:   1/10/2020      Next Visit Date:  Future Appointments   Date Time Provider Sabrina Gonzalez   9/18/2020  3:40 PM Bryant Smith APRN - CNP [facility]

## 2020-08-05 ENCOUNTER — HOSPITAL ENCOUNTER (OUTPATIENT)
Dept: LAB | Age: 50
Discharge: HOME | End: 2020-08-05
Payer: MEDICARE

## 2020-08-05 LAB
ALBUMIN SERPL-MCNC: 4.4 G/DL (ref 3.5–4.6)
ALP BLD-CCNC: 107 U/L (ref 40–130)
ALT SERPL-CCNC: 90 U/L (ref 0–33)
ANION GAP SERPL CALCULATED.3IONS-SCNC: 16 MEQ/L (ref 9–15)
AST SERPL-CCNC: 51 U/L (ref 0–35)
BASOPHILS ABSOLUTE: 0.1 K/UL (ref 0–0.2)
BASOPHILS RELATIVE PERCENT: 0.9 %
BILIRUB SERPL-MCNC: 0.4 MG/DL (ref 0.2–0.7)
BUN BLDV-MCNC: 18 MG/DL (ref 6–20)
CALCIUM SERPL-MCNC: 9.7 MG/DL (ref 8.5–9.9)
CHLORIDE BLD-SCNC: 104 MEQ/L (ref 95–107)
CHOLESTEROL, TOTAL: 229 MG/DL (ref 0–199)
CO2: 23 MEQ/L (ref 20–31)
CREAT SERPL-MCNC: 0.66 MG/DL (ref 0.5–0.9)
EOSINOPHILS ABSOLUTE: 0.3 K/UL (ref 0–0.7)
EOSINOPHILS RELATIVE PERCENT: 4.2 %
GFR AFRICAN AMERICAN: >60
GFR NON-AFRICAN AMERICAN: >60
GLOBULIN: 2.7 G/DL (ref 2.3–3.5)
GLUCOSE BLD-MCNC: 117 MG/DL (ref 70–99)
HCT VFR BLD CALC: 43.6 % (ref 37–47)
HDLC SERPL-MCNC: 62 MG/DL (ref 40–59)
HEMOGLOBIN: 14.7 G/DL (ref 12–16)
LDL CHOLESTEROL CALCULATED: 141 MG/DL (ref 0–129)
LYMPHOCYTES ABSOLUTE: 1.8 K/UL (ref 1–4.8)
LYMPHOCYTES RELATIVE PERCENT: 27.9 %
MCH RBC QN AUTO: 33.3 PG (ref 27–31.3)
MCHC RBC AUTO-ENTMCNC: 33.7 % (ref 33–37)
MCV RBC AUTO: 98.8 FL (ref 82–100)
MONOCYTES ABSOLUTE: 0.6 K/UL (ref 0.2–0.8)
MONOCYTES RELATIVE PERCENT: 9.4 %
NEUTROPHILS ABSOLUTE: 3.7 K/UL (ref 1.4–6.5)
NEUTROPHILS RELATIVE PERCENT: 57.6 %
PDW BLD-RTO: 13.7 % (ref 11.5–14.5)
PLATELET # BLD: 167 K/UL (ref 130–400)
POTASSIUM SERPL-SCNC: 4.8 MEQ/L (ref 3.4–4.9)
RBC # BLD: 4.41 M/UL (ref 4.2–5.4)
SODIUM BLD-SCNC: 143 MEQ/L (ref 135–144)
T4 FREE: 1.14 NG/DL (ref 0.84–1.68)
TOTAL PROTEIN: 7.1 G/DL (ref 6.3–8)
TRIGL SERPL-MCNC: 131 MG/DL (ref 0–150)
TSH SERPL DL<=0.05 MIU/L-ACNC: 2.27 UIU/ML (ref 0.44–3.86)
VITAMIN B-12: 511 PG/ML (ref 232–1245)
VITAMIN D 25-HYDROXY: 40.7 NG/ML (ref 30–100)
WBC # BLD: 6.5 K/UL (ref 4.8–10.8)

## 2020-08-05 PROCEDURE — 82607 VITAMIN B-12: CPT

## 2020-08-05 PROCEDURE — 84443 ASSAY THYROID STIM HORMONE: CPT

## 2020-08-05 PROCEDURE — 80053 COMPREHEN METABOLIC PANEL: CPT

## 2020-08-05 PROCEDURE — 36415 COLL VENOUS BLD VENIPUNCTURE: CPT

## 2020-08-05 PROCEDURE — 85025 COMPLETE CBC W/AUTO DIFF WBC: CPT

## 2020-08-05 PROCEDURE — 82306 VITAMIN D 25 HYDROXY: CPT

## 2020-08-05 PROCEDURE — 80061 LIPID PANEL: CPT

## 2020-08-05 PROCEDURE — 84439 ASSAY OF FREE THYROXINE: CPT

## 2020-08-13 RX ORDER — CLONAZEPAM 0.5 MG/1
0.5 TABLET ORAL 2 TIMES DAILY PRN
Qty: 20 TABLET | Refills: 0 | Status: SHIPPED | OUTPATIENT
Start: 2020-08-13 | End: 2020-08-31

## 2020-08-31 RX ORDER — CLONAZEPAM 0.5 MG/1
0.5 TABLET ORAL 2 TIMES DAILY PRN
Qty: 20 TABLET | Refills: 0 | Status: SHIPPED | OUTPATIENT
Start: 2020-08-31 | End: 2020-09-18 | Stop reason: SDUPTHER

## 2020-08-31 NOTE — TELEPHONE ENCOUNTER
Drug Bindu Dumont is requesting medication refill. Please approve or deny this request.    Rx requested:  Requested Prescriptions     Pending Prescriptions Disp Refills    clonazePAM (KLONOPIN) 0.5 MG tablet [Pharmacy Med Name: clonazepam 0.5 mg tablet] 20 tablet 0     Sig: Take 1 tablet by mouth 2 times daily as needed for Anxiety for up to 30 days.          Last Office Visit:   6/18/2020      Next Visit Date:  Future Appointments   Date Time Provider Sabrina Gonzalez   9/18/2020  3:40 PM Piotr Levi, [address]

## 2020-08-31 NOTE — TELEPHONE ENCOUNTER
Caty from [facility] called wanted to know if the clonazePAM (KLONOPIN) 0.5 MG tablet was to be for 30 day or 10 days so I asked Stephanie Roach and she said it was for 30 days so I called back the pharmacy and let them know. cp

## 2020-09-02 NOTE — TELEPHONE ENCOUNTER
Per patient pharmacy is still confused as to what exactly the directions are for this RX. Please call the pharmacy and clarify. The patient is saying that they are refusing to fill it unless someone one calls and clarifies the directions.

## 2020-09-03 ENCOUNTER — PATIENT MESSAGE (OUTPATIENT)
Dept: FAMILY MEDICINE CLINIC | Age: 50
End: 2020-09-03

## 2020-09-03 NOTE — TELEPHONE ENCOUNTER
Emilia Morrissey. If she is taking a tablet per day then the #days would be 20 for 20 tablets. Please allow for refill now    Call pharmacy with this information please.

## 2020-09-03 NOTE — TELEPHONE ENCOUNTER
From: Alli Landing  To: Joseelymalik Johnneel APRN - CNP  Sent: 9/3/2020 9:36 AM EDT  Subject: Prescription Question    I'm not understanding why the pharmacy won't fill my prescription. .. I've never had a problem before. . I've taking as prescribed and I'm out. .. I've taking more this month because my Dad has been having very serious medical issues. . but I had 20 pills prescribed on August 13 I have no more and worried about the holiday weekend. . even enough to get me through until my appointment on the 18th. Elaina Pittman

## 2020-09-18 ENCOUNTER — VIRTUAL VISIT (OUTPATIENT)
Dept: FAMILY MEDICINE CLINIC | Age: 50
End: 2020-09-18
Payer: MEDICARE

## 2020-09-18 PROCEDURE — 99214 OFFICE O/P EST MOD 30 MIN: CPT | Performed by: NURSE PRACTITIONER

## 2020-09-18 PROCEDURE — G8428 CUR MEDS NOT DOCUMENT: HCPCS | Performed by: NURSE PRACTITIONER

## 2020-09-18 RX ORDER — CLONAZEPAM 0.5 MG/1
0.5 TABLET ORAL 2 TIMES DAILY PRN
Qty: 30 TABLET | Refills: 2 | Status: SHIPPED | OUTPATIENT
Start: 2020-09-18 | End: 2020-12-31 | Stop reason: SDUPTHER

## 2020-09-18 NOTE — PROGRESS NOTES
2020    TELEHEALTH EVALUATION -- Audio/Visual (During KOCJD-16 public health emergency)    Due to [place] 19 outbreak, patient's office visit was converted to a virtual visit. Patient was contacted and agreed to proceed with a virtual visit via Doxy. me  The risks and benefits of converting to a virtual visit were discussed in light of the current infectious disease epidemic. Patient also understood that insurance coverage and co-pays are up to their individual insurance plans. Nikki Vaz is a 53 y.o. female being evaluated by a Virtual Visit (video visit) encounter to address concerns as mentioned above. A caregiver was present when appropriate. Due to this being a TeleHealth encounter (During ZSOBC-28 public health emergency), evaluation of the following organ systems was limited: Vitals/Constitutional/EENT/Resp/CV/GI//MS/Neuro/Skin/Heme-Lymph-Imm. Pursuant to the emergency declaration under the [address] [place] [address] authority and the [employer] and [employer] Act, this Virtual Visit was conducted with patient's (and/or legal guardian's) consent, to reduce the patient's risk of exposure to COVID-19 and provide necessary medical care. The patient (and/or legal guardian) has also been advised to contact this office for worsening conditions or problems, and seek emergency medical treatment and/or call 911 if deemed necessary. Patient identification was verified at the start of the visit: Yes    Total time spent for this encounter: Not billed by time    Services were provided through a video synchronous discussion virtually to substitute for in-person clinic visit. Patient and provider were located at their individual homes. The patient is talking with me virtually from her home and I am located at my office in [facility].        HPI:    Nikki Vaz (:  1970) has requested an audio/video evaluation for the following concern(s): medication refill and review lab. Anxiety: She states that she continues to take Klonopin as needed but this tends to be about once per day lately. She tries to take a sparingly as possible but states that it is helpful in reducing panic symptoms when she starts to feel very anxious. She denies feeling down or depressed. No thoughts of self-harm or harm to others. No side effects with the medication. Dyslipidemia: She states that overall she tries to eat a healthy diet and does get some routine physical activity. She is not aware of any family history of high cholesterol. Upon further discussion, she states that she has been eating a lot more cheese than normal.    Daily alcohol intake: We discussed her current intake of alcohol and she states that she does have some wine in the evenings but nothing throughout the day. She does not feel that she has an unhealthy relationship with alcohol intake and states that she does not take the Klonopin anytime near where she is drinking alcohol. States that her drink of choice has been wine. Review of Systems   This patient reports no chest pain or pressure. There is no shortness of breath or cough. The patient reports no nausea or vomiting. There is no heartburn or indigestion. There is no diarrhea or constipation. No black, bloody, mucusy or tarry stool noticed. The patient reports no bloating and no change in appetite. There is no numbness, tingling or swelling in the extremities. Prior to Visit Medications    Medication Sig Taking? Authorizing Provider   clonazePAM (KLONOPIN) 0.5 MG tablet Take 1 tablet by mouth 2 times daily as needed for Anxiety for up to 90 days.  Yes OANH Walsh CNP   fluticasone (FLONASE) 50 MCG/ACT nasal spray use 1 spray by nasal route daily  OANH Walsh CNP       Social History     Tobacco Use    Smoking status: Current Every Day Smoker     Packs/day: 0.50    Smokeless tobacco: Never Used   Substance Use Topics    Alcohol use: Yes    Drug use: No        PHYSICAL EXAMINATION:  [ INSTRUCTIONS:  \"[x]\" Indicates a positive item  \"[]\" Indicates a negative item  -- DELETE ALL ITEMS NOT EXAMINED]  [x] Alert  [x] Oriented to person/place/time    [x] No apparent distress  [] Toxic appearing    [] Face flushed appearing [] Sclera clear  [] Lips are cyanotic      [x] Breathing appears normal  [] Appears tachypneic      [] Rash on visible skin    [x] Cranial Nerves II-XII grossly intact    [] Motor grossly intact in visible upper extremities    [] Motor grossly intact in visible lower extremities    [x] Normal Mood  [] Anxious appearing    [] Depressed appearing  [] Confused appearing      [] Poor short term memory  [] Poor long term memory    [] OTHER:      Due to this being a TeleHealth encounter, evaluation of the following organ systems is limited: Vitals/Constitutional/EENT/Resp/CV/GI//MS/Neuro/Skin/Heme-Lymph-Imm. ASSESSMENT/PLAN:   Diagnosis Orders   1. Anxiety  clonazePAM (KLONOPIN) 0.5 MG tablet   2. Panic attacks  clonazePAM (KLONOPIN) 0.5 MG tablet   3. Dyslipidemia  CBC Auto Differential    Comprehensive Metabolic Panel    Lipid Panel   4. Elevated glucose  Hemoglobin A1C   5. Elevated liver enzymes     6. Daily consumption of alcohol- wine           Return in about 3 months (around 12/18/2020). 1.  2.    5. 6.     Continue Klonopin as needed but typically not more than once per day. She is aware of need to avoid alcohol intake when taking the medication. We did discuss need to limit alcohol intake. We also reviewed recent lab results with elevation of liver enzymes and I do recommend repeating blood work prior to the end of the year. She verbalizes understanding. 3.  4.  Discussed elevation in fasting glucose as well as elevation in LDL. Discussed recommended dietary and lifestyle modifications and will recheck fasting lab in December.     Controlled

## 2020-10-31 ENCOUNTER — OFFICE VISIT (OUTPATIENT)
Dept: FAMILY MEDICINE CLINIC | Age: 50
End: 2020-10-31
Payer: MEDICARE

## 2020-10-31 VITALS
WEIGHT: 152.8 LBS | DIASTOLIC BLOOD PRESSURE: 78 MMHG | SYSTOLIC BLOOD PRESSURE: 126 MMHG | RESPIRATION RATE: 18 BRPM | TEMPERATURE: 97.3 F | HEART RATE: 84 BPM | OXYGEN SATURATION: 97 % | HEIGHT: 66 IN | BODY MASS INDEX: 24.56 KG/M2

## 2020-10-31 PROCEDURE — G8427 DOCREV CUR MEDS BY ELIG CLIN: HCPCS | Performed by: NURSE PRACTITIONER

## 2020-10-31 PROCEDURE — 4004F PT TOBACCO SCREEN RCVD TLK: CPT | Performed by: NURSE PRACTITIONER

## 2020-10-31 PROCEDURE — G8420 CALC BMI NORM PARAMETERS: HCPCS | Performed by: NURSE PRACTITIONER

## 2020-10-31 PROCEDURE — G8484 FLU IMMUNIZE NO ADMIN: HCPCS | Performed by: NURSE PRACTITIONER

## 2020-10-31 PROCEDURE — 99213 OFFICE O/P EST LOW 20 MIN: CPT | Performed by: NURSE PRACTITIONER

## 2020-10-31 RX ORDER — PREDNISONE 20 MG/1
40 TABLET ORAL DAILY
Qty: 10 TABLET | Refills: 0 | Status: SHIPPED | OUTPATIENT
Start: 2020-10-31 | End: 2022-07-29 | Stop reason: SDUPTHER

## 2020-10-31 RX ORDER — AZITHROMYCIN 250 MG/1
TABLET, FILM COATED ORAL
Qty: 6 TABLET | Refills: 0 | Status: SHIPPED | OUTPATIENT
Start: 2020-10-31 | End: 2022-07-29 | Stop reason: SDUPTHER

## 2020-10-31 ASSESSMENT — ENCOUNTER SYMPTOMS: COUGH: 1

## 2020-10-31 NOTE — PATIENT INSTRUCTIONS
help and they may be harmful. Bronchitis usually develops rapidly and lasts about 2 to 3 weeks in otherwise healthy people. Follow-up care is a key part of your treatment and safety. Be sure to make and go to all appointments, and call your doctor if you are having problems. It's also a good idea to know your test results and keep a list of the medicines you take. How can you care for yourself at home? · Take all medicines exactly as prescribed. Call your doctor if you think you are having a problem with your medicine. · Get some extra rest.  · Take an over-the-counter pain medicine, such as acetaminophen (Tylenol), ibuprofen (Advil, Motrin), or naproxen (Aleve) to reduce fever and relieve body aches. Read and follow all instructions on the label. · Do not take two or more pain medicines at the same time unless the doctor told you to. Many pain medicines have acetaminophen, which is Tylenol. Too much acetaminophen (Tylenol) can be harmful. · Take an over-the-counter cough medicine that contains dextromethorphan to help quiet a dry, hacking cough so that you can sleep. Avoid cough medicines that have more than one active ingredient. Read and follow all instructions on the label. · Breathe moist air from a humidifier, hot shower, or sink filled with hot water. The heat and moisture will thin mucus so you can cough it out. · Do not smoke. Smoking can make bronchitis worse. If you need help quitting, talk to your doctor about stop-smoking programs and medicines. These can increase your chances of quitting for good. When should you call for help? Call 911 anytime you think you may need emergency care. For example, call if:    · You have severe trouble breathing. Call your doctor now or seek immediate medical care if:    · You have new or worse trouble breathing.     · You cough up dark brown or bloody mucus (sputum).     · You have a new or higher fever.     · You have a new rash.    Watch closely for

## 2020-10-31 NOTE — PROGRESS NOTES
10/31/2020     SUBJECTIVE:     Elsa Umanzor 53 y.o. female presents today with:  Chief Complaint   Patient presents with    Cough     x3 days. Productive cough at first, now dry cough. Sinus headache. Cough   This is a new problem. The current episode started in the past 7 days. The problem has been gradually worsening. The cough is productive of sputum (occasionally, now mostly dry). Associated symptoms include headaches and postnasal drip. Pertinent negatives include no chest pain, chills, fever, myalgias, sore throat or shortness of breath. Risk factors for lung disease include smoking/tobacco exposure. Treatments tried: Flonase. The treatment provided no relief. Her past medical history is significant for asthma and bronchitis. Review of Systems   Constitutional: Negative for chills and fever. HENT: Positive for congestion, postnasal drip and sinus pressure. Negative for sore throat. Respiratory: Positive for cough. Negative for chest tightness and shortness of breath. Cardiovascular: Negative for chest pain. Gastrointestinal: Negative for abdominal pain, diarrhea, nausea and vomiting. Musculoskeletal: Negative for myalgias. Neurological: Positive for headaches. Negative for dizziness. OBJECTIVE:     Vitals:    10/31/20 1350   BP: 126/78   Site: Right Upper Arm   Position: Sitting   Cuff Size: Medium Adult   Pulse: 84   Resp: 18   Temp: 97.3 °F (36.3 °C)   TempSrc: Temporal   SpO2: 97%   Weight: 152 lb 12.8 oz (69.3 kg)   Height: 5' 6\" (1.676 m)     Physical Exam  Vitals signs reviewed. Constitutional:       Appearance: She is well-groomed. She is not toxic-appearing. Comments: frequent dry cough/ throat clearing   HENT:      Head: Normocephalic and atraumatic. Right Ear: External ear normal.      Left Ear: External ear normal.      Nose: Congestion present. Right Sinus: Frontal sinus tenderness present. Left Sinus: Frontal sinus tenderness present. Mouth/Throat:      Lips: No lesions. Mouth: Mucous membranes are moist.      Pharynx: Oropharynx is clear. Uvula midline. Eyes:      General: Lids are normal.      Extraocular Movements: Extraocular movements intact. Conjunctiva/sclera: Conjunctivae normal.      Pupils: Pupils are equal, round, and reactive to light. Neck:      Musculoskeletal: Normal range of motion and neck supple. Trachea: Trachea and phonation normal.   Cardiovascular:      Rate and Rhythm: Normal rate and regular rhythm. Heart sounds: Normal heart sounds. No friction rub. Pulmonary:      Effort: Pulmonary effort is normal. No tachypnea. Breath sounds: Normal air entry. Examination of the right-upper field reveals rhonchi. Examination of the left-upper field reveals rhonchi. Rhonchi (clears w/ coughing) present. Musculoskeletal: Normal range of motion. Right lower leg: No edema. Left lower leg: No edema. Skin:     General: Skin is warm and dry. Capillary Refill: Capillary refill takes less than 2 seconds. Findings: No rash. Neurological:      General: No focal deficit present. Mental Status: She is alert and oriented to person, place, and time. Mental status is at baseline. Motor: Motor function is intact. Gait: Gait is intact. Psychiatric:         Mood and Affect: Mood and affect normal.         Speech: Speech normal.         Behavior: Behavior is cooperative. POC Testing Today: No results found for this visit on 10/31/20. ASSESSMENT & PLAN:   53 y.o. female, current daily smoker, presenting w/ one week of cough and congestion. No known exposures to COVID-19. Afebrile, non-toxic appearing w/o significant hypoxia. Given presentation + duration of sxs w/o improvement over time, will start antibiotic + short steroid course. Recommended close follow-up w/ PCP if no improvement or worsening within the next week.     Diagnoses and all orders for this visit:    ICD-10-CM 1. Upper respiratory infection with cough and congestion  J06.9 azithromycin (ZITHROMAX) 250 MG tablet     predniSONE (DELTASONE) 20 MG tablet     Orders Placed This Encounter   Medications    azithromycin (ZITHROMAX) 250 MG tablet     Si mg on day 1, then 250 mg days 2-5. Dispense:  6 tablet     Refill:  0    predniSONE (DELTASONE) 20 MG tablet     Sig: Take 2 tablets by mouth daily for 5 days     Dispense:  10 tablet     Refill:  0   OTC analgesics/antipyretics per package directions prn  Push fluids, rest, use vaporizer or mist prn, apply heat to sinuses prn  Use Flonase consistently    Antibiotic Instructions: Complete the full course of antibiotics as ordered. Take each dose with a small snack or meal to lessen potential GI upset. To prevent antibiotic resistance, please take medication as ordered and for the full duration even if you start to feel better. Consider intake of yogurt or probiotic during antibiotic use and for a few days after to help reduce the risk of developing a secondary infection. Separate the yogurt and antibiotic by at least 1 hour. Avoid alcohol while taking antibiotics. Oral Steroid Instructions: Take each dose with a small snack or meal to lessen potential GI upset. Follow dosing instructions provided with prescription. Common side effects include difficulty sleeping and irritability. Take full course as ordered. Fluticasone Nasal Spray Instructions: Insert bottle into nostril and use finger to pinch the opposite nostril closed. Look slightly down with your head. Inhale through your nose while spraying the medicine. Repeat back and forth with each nostril for two sprays into each nostril, four sprays total one time per day. The effect of the medication may not be felt immediately; it builds over repeated usage. Return for follow-up if symptoms are not improving in the next 3-5 days-- sooner if worsening in any way.       Side effects and adverse effects of any

## 2020-11-03 ENCOUNTER — TELEPHONE (OUTPATIENT)
Dept: FAMILY MEDICINE CLINIC | Age: 50
End: 2020-11-03

## 2020-11-12 ASSESSMENT — ENCOUNTER SYMPTOMS
DIARRHEA: 0
SORE THROAT: 0
VOMITING: 0
NAUSEA: 0
SHORTNESS OF BREATH: 0
SINUS PRESSURE: 1
CHEST TIGHTNESS: 0
ABDOMINAL PAIN: 0

## 2021-02-15 DIAGNOSIS — F41.0 PANIC ATTACKS: ICD-10-CM

## 2021-02-15 DIAGNOSIS — F41.9 ANXIETY: ICD-10-CM

## 2021-02-15 RX ORDER — CLONAZEPAM 0.5 MG/1
0.5 TABLET ORAL 2 TIMES DAILY PRN
Qty: 30 TABLET | Refills: 0 | Status: SHIPPED | OUTPATIENT
Start: 2021-02-15 | End: 2021-02-18 | Stop reason: SDUPTHER

## 2021-02-18 ENCOUNTER — VIRTUAL VISIT (OUTPATIENT)
Dept: FAMILY MEDICINE CLINIC | Age: 51
End: 2021-02-18
Payer: MEDICARE

## 2021-02-18 DIAGNOSIS — J01.40 ACUTE NON-RECURRENT PANSINUSITIS: ICD-10-CM

## 2021-02-18 DIAGNOSIS — R09.82 POST-NASAL DRIP: ICD-10-CM

## 2021-02-18 DIAGNOSIS — F17.200 CURRENT SMOKER: ICD-10-CM

## 2021-02-18 DIAGNOSIS — J30.9 ALLERGIC RHINITIS, UNSPECIFIED SEASONALITY, UNSPECIFIED TRIGGER: ICD-10-CM

## 2021-02-18 DIAGNOSIS — Z87.09 HISTORY OF URI (UPPER RESPIRATORY INFECTION): ICD-10-CM

## 2021-02-18 DIAGNOSIS — F41.0 PANIC ATTACKS: ICD-10-CM

## 2021-02-18 DIAGNOSIS — R92.2 DENSE BREASTS: ICD-10-CM

## 2021-02-18 DIAGNOSIS — F41.9 ANXIETY: Primary | ICD-10-CM

## 2021-02-18 DIAGNOSIS — Z12.31 ENCOUNTER FOR SCREENING MAMMOGRAM FOR BREAST CANCER: ICD-10-CM

## 2021-02-18 PROCEDURE — G8427 DOCREV CUR MEDS BY ELIG CLIN: HCPCS | Performed by: NURSE PRACTITIONER

## 2021-02-18 PROCEDURE — 99214 OFFICE O/P EST MOD 30 MIN: CPT | Performed by: NURSE PRACTITIONER

## 2021-02-18 RX ORDER — CLONAZEPAM 0.5 MG/1
0.5 TABLET ORAL 2 TIMES DAILY PRN
Qty: 30 TABLET | Refills: 1 | Status: SHIPPED | OUTPATIENT
Start: 2021-03-17 | End: 2021-03-29 | Stop reason: SDUPTHER

## 2021-02-18 RX ORDER — IPRATROPIUM BROMIDE 42 UG/1
2 SPRAY, METERED NASAL 4 TIMES DAILY
Qty: 1 BOTTLE | Refills: 3 | Status: SHIPPED | OUTPATIENT
Start: 2021-02-18 | End: 2021-05-04 | Stop reason: ALTCHOICE

## 2021-02-18 RX ORDER — FLUTICASONE PROPIONATE 50 MCG
2 SPRAY, SUSPENSION (ML) NASAL DAILY
Qty: 16 G | Refills: 3 | Status: SHIPPED | OUTPATIENT
Start: 2021-02-18 | End: 2021-05-04 | Stop reason: SDUPTHER

## 2021-02-18 SDOH — ECONOMIC STABILITY: FOOD INSECURITY: WITHIN THE PAST 12 MONTHS, THE FOOD YOU BOUGHT JUST DIDN'T LAST AND YOU DIDN'T HAVE MONEY TO GET MORE.: NEVER TRUE

## 2021-02-18 SDOH — ECONOMIC STABILITY: TRANSPORTATION INSECURITY
IN THE PAST 12 MONTHS, HAS LACK OF TRANSPORTATION KEPT YOU FROM MEETINGS, WORK, OR FROM GETTING THINGS NEEDED FOR DAILY LIVING?: NO

## 2021-02-18 ASSESSMENT — PATIENT HEALTH QUESTIONNAIRE - PHQ9
1. LITTLE INTEREST OR PLEASURE IN DOING THINGS: 0
SUM OF ALL RESPONSES TO PHQ9 QUESTIONS 1 & 2: 0
SUM OF ALL RESPONSES TO PHQ QUESTIONS 1-9: 0
SUM OF ALL RESPONSES TO PHQ QUESTIONS 1-9: 0
2. FEELING DOWN, DEPRESSED OR HOPELESS: 0

## 2021-02-18 NOTE — PROGRESS NOTES
2/18/2021    TELEHEALTH EVALUATION -- Audio/Visual ([facility])    Due to [place] outbreak, patient's office visit was converted to a virtual visit. Patient was contacted and agreed to proceed with a virtual visit via Doxy. me  The risks and benefits of converting to a virtual visit were discussed in light of the current infectious disease epidemic. Patient also understood that insurance coverage and co-pays are up to their individual insurance plans. Zarina Carter is a 53 y.o. female being evaluated by a Virtual Visit (video visit) encounter to address concerns as mentioned above. A caregiver was present when appropriate. Due to this being a TeleHealth encounter (During YIJ-70 public health emergency), evaluation of the following organ systems was limited: Vitals/Constitutional/EENT/Resp/CV/GI//MS/Neuro/Skin/Heme-Lymph-Imm. Pursuant to the emergency declaration under the [address] [place] [address] and the [employer] and [employer] Act, this Virtual Visit was conducted with patient's (and/or legal guardian's) consent, to reduce the patient's risk of exposure to COVID-19 and provide necessary medical care. The patient (and/or legal guardian) has also been advised to contact this office for worsening conditions or problems, and seek emergency medical treatment and/or call 911 if deemed necessary. Patient identification was verified at the start of the visit: Yes    Total time spent for this encounter: Not billed by time    Services were provided through a video synchronous discussion virtually to substitute for in-person clinic visit. Patient and provider were located at their individual homes. The patient is talking with me virtually from her home and I am located at my office in [facility].          HPI: Nhi Guy (:  1970) has requested an audio/video evaluation for the following concern(s): Anxiety: He states overall her anxiety symptoms have been stable. She continues to take Klonopin as needed and generally this ends up being about 2 times a day depending on the day. She has not had any side effects with the medication and states that it does help improve physical symptoms of anxiety that she was getting. She has not had any severe panic attacks lately. Dense breasts: She states that she is due for routine mammogram but she has a history of dense breast tissue that has required diagnostic mammograms with ultrasounds. She is interested in a 3D mammogram if possible. She has not noticed any changes to self breast exam.    Allergic rhinitis/post nasal drip: She states that she has issues with postnasal drip on a daily basis. Has had this ongoing for quite some time and has been using Flonase nasal spray every day. Does not like taking antihistamine medication. She states that she has been having a bit of a dry cough due to the amount of postnasal drip that she has been having. She does not report any sinus pain or pressure. No discolored drainage from the nose. Current smoker: He states that she continues to smoke and is back up to smoking about a pack of cigarettes per day but does hope to decrease the amounts again. She was down to about a quarter of a pack but with the increased stress secondary to the pandemic, she has started smoking more. She has not had any chest congestion or difficulty breathing.     Review of Systems This patient reports no chest pain or pressure. There is no shortness of breath or cough. The patient reports no nausea or vomiting. There is no heartburn or indigestion. There is no diarrhea or constipation. No black, bloody, mucusy or tarry stool noticed. The patient reports no bloating and no change in appetite. There is no numbness, tingling or swelling in the extremities. Prior to Visit Medications    Medication Sig Taking? Authorizing Provider   clonazePAM (KLONOPIN) 0.5 MG tablet Take 1 tablet by mouth 2 times daily as needed for Anxiety for up to 30 days. Yes OANH Sue CNP   fluticasone (FLONASE) 50 MCG/ACT nasal spray 2 sprays by Nasal route daily Yes OANH Sue CNP   ipratropium (ATROVENT) 0.06 % nasal spray 2 sprays by Each Nostril route 4 times daily Yes OANH Sue CNP       Social History     Tobacco Use    Smoking status: Current Every Day Smoker     Packs/day: 0.50    Smokeless tobacco: Never Used   Substance Use Topics    Alcohol use: Yes    Drug use: No          PHYSICAL EXAMINATION:  [ INSTRUCTIONS:  \"[x]\" Indicates a positive item  \"[]\" Indicates a negative item  -- DELETE ALL ITEMS NOT EXAMINED]  [x] Alert  [x] Oriented to person/place/time    [x] No apparent distress  [] Toxic appearing    [] Face flushed appearing [] Sclera clear  [] Lips are cyanotic      [x] Breathing appears normal  [] Appears tachypneic      [] Rash on visible skin    [x] Cranial Nerves II-XII grossly intact    [x] Motor grossly intact in visible upper extremities    [] Motor grossly intact in visible lower extremities    [x] Normal Mood  [] Anxious appearing    [] Depressed appearing  [] Confused appearing      [] Poor short term memory  [] Poor long term memory    [] OTHER:      Due to this being a TeleHealth encounter, evaluation of the following organ systems is limited: Vitals/Constitutional/EENT/Resp/CV/GI//MS/Neuro/Skin/Heme-Lymph-Imm.     ASSESSMENT/PLAN: Diagnosis Orders   1. Anxiety  clonazePAM (KLONOPIN) 0.5 MG tablet   2. Panic attacks  clonazePAM (KLONOPIN) 0.5 MG tablet   3. Acute non-recurrent pansinusitis  fluticasone (FLONASE) 50 MCG/ACT nasal spray   4. Dense breasts  RANDA DIGITAL SCREEN W OR WO CAD BILATERAL   5. History of URI (upper respiratory infection)  Covid-19, Antibody   6. Allergic rhinitis, unspecified seasonality, unspecified trigger  ipratropium (ATROVENT) 0.06 % nasal spray   7. Post-nasal drip  ipratropium (ATROVENT) 0.06 % nasal spray   8. Current smoker     9. Encounter for screening mammogram for breast cancer  RANDA DIGITAL SCREEN W OR WO CAD BILATERAL       Return in about 3 months (around 5/18/2021). 1. 2.  Anxiety symptoms have been stable with Klonopin taken routinely at sparingly. She has not had any severe panic attack symptoms reported lately. No side effects with the medication and it has been effective in treating more severe states of anxiety. 3.  5.  6.  7.  8.  Discussed recommendation for smoking cessation and she will try to cut back on cigarette consumption before her next visit. Recommend trying Atrovent nasal spray to help dry up postnasal drip. She prefers not to take daily antihistamine. We will get Covid antibody testing. She is hesitant to have vaccine done and would like to know if she has had exposure to the Covid virus before. 4. 9.  Routine mammogram order given but recommend 3D and this is written on the order. Controlled Substance Monitoring:    Acute and Chronic Pain Monitoring:   RX Monitoring 2/18/2021   Attestation -   Periodic Controlled Substance Monitoring Possible medication side effects, risk of tolerance/dependence & alternative treatments discussed. ;No signs of potential drug abuse or diversion identified. ;Assessed functional status.    Chronic Pain > 80 MEDD - Please note this report has been partially produced using speech recognition software and may cause contain errors related to that system including grammar, punctuation and spelling as well as words and phrases that may seem inappropriate. If there are questions or concerns please feel free to contact me to clarify. An  electronic signature was used to authenticate this note. --OANH Garcia - CNP on 2/18/2021 at 1:02 PM        Pursuant to the emergency declaration under the [facility], [facility] waiver authority and the [employer] and [employer] Act, this Virtual  Visit was conducted, with patient's consent, to reduce the patient's risk of exposure to COVID-19 and provide continuity of care for an established patient. Services were provided through a video synchronous discussion virtually to substitute for in-person clinic visit.

## 2021-03-29 DIAGNOSIS — F41.9 ANXIETY: ICD-10-CM

## 2021-03-29 DIAGNOSIS — F41.0 PANIC ATTACKS: ICD-10-CM

## 2021-03-29 RX ORDER — CLONAZEPAM 0.5 MG/1
0.5 TABLET ORAL 2 TIMES DAILY PRN
Qty: 30 TABLET | Refills: 0 | Status: SHIPPED | OUTPATIENT
Start: 2021-03-29 | End: 2021-05-04 | Stop reason: SDUPTHER

## 2021-03-29 NOTE — TELEPHONE ENCOUNTER
Patient called in she is requesting a refill to last her till 4/12/2021 when she is scheduled to see you     please advise    Rx request   Requested Prescriptions     Pending Prescriptions Disp Refills    clonazePAM (KLONOPIN) 0.5 MG tablet 30 tablet 1     Sig: Take 1 tablet by mouth 2 times daily as needed for Anxiety for up to 30 days.      LOV 2/18/2021  Next Visit Date:  Future Appointments   Date Time Provider Sabrina Gonzalez   4/12/2021  9:10 AM Lauren Chung, [address]

## 2021-05-04 ENCOUNTER — NURSE ONLY (OUTPATIENT)
Dept: FAMILY MEDICINE CLINIC | Age: 51
End: 2021-05-04
Payer: MEDICARE

## 2021-05-04 ENCOUNTER — HOSPITAL ENCOUNTER (OUTPATIENT)
Age: 51
Setting detail: SPECIMEN
Discharge: HOME | End: 2021-05-04
Payer: MEDICARE

## 2021-05-04 DIAGNOSIS — J01.40 ACUTE NON-RECURRENT PANSINUSITIS: ICD-10-CM

## 2021-05-04 DIAGNOSIS — F17.200 CURRENT SMOKER: ICD-10-CM

## 2021-05-04 DIAGNOSIS — F41.9 ANXIETY: Primary | ICD-10-CM

## 2021-05-04 DIAGNOSIS — F41.0 PANIC ATTACKS: ICD-10-CM

## 2021-05-04 DIAGNOSIS — R09.82 POST-NASAL DRIP: ICD-10-CM

## 2021-05-04 DIAGNOSIS — J30.9 ALLERGIC RHINITIS, UNSPECIFIED SEASONALITY, UNSPECIFIED TRIGGER: ICD-10-CM

## 2021-05-04 DIAGNOSIS — Z79.899 HIGH RISK MEDICATION USE: ICD-10-CM

## 2021-05-04 PROCEDURE — 80307 DRUG TEST PRSMV CHEM ANLYZR: CPT

## 2021-05-04 PROCEDURE — 99214 OFFICE O/P EST MOD 30 MIN: CPT | Performed by: NURSE PRACTITIONER

## 2021-05-04 RX ORDER — FLUTICASONE PROPIONATE 50 MCG
2 SPRAY, SUSPENSION (ML) NASAL DAILY
Qty: 16 G | Refills: 3 | Status: SHIPPED | OUTPATIENT
Start: 2021-05-04 | End: 2021-06-22 | Stop reason: SDUPTHER

## 2021-05-04 RX ORDER — CLONAZEPAM 0.5 MG/1
0.5 TABLET ORAL 2 TIMES DAILY PRN
Qty: 30 TABLET | Refills: 0 | Status: SHIPPED | OUTPATIENT
Start: 2021-05-04 | End: 2021-06-22 | Stop reason: SDUPTHER

## 2021-05-04 ASSESSMENT — PATIENT HEALTH QUESTIONNAIRE - PHQ9
SUM OF ALL RESPONSES TO PHQ9 QUESTIONS 1 & 2: 0
2. FEELING DOWN, DEPRESSED OR HOPELESS: 0

## 2021-05-07 LAB
6-ACETYLMORPHINE: NOT DETECTED
7-AMINOCLONAZEPAM: NOT DETECTED
ALPHA-OH-ALPRAZOLAM: NOT DETECTED
ALPHA-OH-MIDAZOLAM, URINE: NOT DETECTED
ALPRAZOLAM: NOT DETECTED
AMPHETAMINE: NOT DETECTED
BARBITURATES: NOT DETECTED
BENZOYLECGONINE: NOT DETECTED
BUPRENORPHINE: NOT DETECTED
CARISOPRODOL: NOT DETECTED
CLONAZEPAM: NOT DETECTED
CODEINE: NOT DETECTED
CREATININE URINE: 31 MG/DL (ref 20–400)
DIAZEPAM: NOT DETECTED
EER PAIN MGT DRUG PANEL, HIGH RES/EMIT U: NORMAL
ETHYL GLUCURONIDE: PRESENT
FENTANYL: NOT DETECTED
GABAPENTIN: NOT DETECTED
HYDROCODONE: NOT DETECTED
HYDROMORPHONE: NOT DETECTED
LORAZEPAM: NOT DETECTED
MARIJUANA METABOLITE: NOT DETECTED
MDA: NOT DETECTED
MDEA: NOT DETECTED
MDMA URINE: NOT DETECTED
MEPERIDINE: NOT DETECTED
METHADONE: NOT DETECTED
METHAMPHETAMINE: NOT DETECTED
METHYLPHENIDATE: NOT DETECTED
MIDAZOLAM: NOT DETECTED
MORPHINE: NOT DETECTED
NALOXONE: NOT DETECTED
NORBUPRENORPHINE, FREE: NOT DETECTED
NORDIAZEPAM: NOT DETECTED
NORFENTANYL: NOT DETECTED
NORHYDROCODONE, URINE: NOT DETECTED
NOROXYCODONE: NOT DETECTED
NOROXYMORPHONE, URINE: NOT DETECTED
OXAZEPAM: NOT DETECTED
OXYCODONE: NOT DETECTED
OXYMORPHONE: NOT DETECTED
PAIN MANAGEMENT DRUG PANEL: NORMAL
PCP: NOT DETECTED
PHENTERMINE: NOT DETECTED
PREGABALIN: NOT DETECTED
TAPENTADOL, URINE: NOT DETECTED
TAPENTADOL-O-SULFATE, URINE: NOT DETECTED
TEMAZEPAM: NOT DETECTED
TRAMADOL: NOT DETECTED
ZOLPIDEM: NOT DETECTED

## 2021-06-22 DIAGNOSIS — F41.0 PANIC ATTACKS: ICD-10-CM

## 2021-06-22 DIAGNOSIS — F41.9 ANXIETY: ICD-10-CM

## 2021-06-22 DIAGNOSIS — J01.40 ACUTE NON-RECURRENT PANSINUSITIS: ICD-10-CM

## 2021-06-22 RX ORDER — FLUTICASONE PROPIONATE 50 MCG
2 SPRAY, SUSPENSION (ML) NASAL DAILY
Qty: 16 G | Refills: 3 | Status: CANCELLED | OUTPATIENT
Start: 2021-06-22

## 2021-06-22 RX ORDER — CLONAZEPAM 0.5 MG/1
0.5 TABLET ORAL 2 TIMES DAILY PRN
Qty: 30 TABLET | Refills: 0 | Status: SHIPPED | OUTPATIENT
Start: 2021-06-22 | End: 2021-06-28 | Stop reason: SDUPTHER

## 2021-06-22 RX ORDER — CLONAZEPAM 0.5 MG/1
0.5 TABLET ORAL 2 TIMES DAILY PRN
Qty: 30 TABLET | Refills: 0 | Status: CANCELLED | OUTPATIENT
Start: 2021-06-22 | End: 2021-07-22

## 2021-06-22 RX ORDER — FLUTICASONE PROPIONATE 50 MCG
2 SPRAY, SUSPENSION (ML) NASAL DAILY
Qty: 16 G | Refills: 3 | Status: SHIPPED | OUTPATIENT
Start: 2021-06-22 | End: 2021-11-09 | Stop reason: SDUPTHER

## 2021-06-22 NOTE — TELEPHONE ENCOUNTER
Patient requesting medication refill. Please approve or deny this request.    Rx requested:  Requested Prescriptions     Pending Prescriptions Disp Refills    clonazePAM (KLONOPIN) 0.5 MG tablet 30 tablet 0     Sig: Take 1 tablet by mouth 2 times daily as needed for Anxiety for up to 30 days.     fluticasone (FLONASE) 50 MCG/ACT nasal spray 16 g 3     Si sprays by Nasal route daily         Last Office Visit:   2021      Next Visit Date:  Future Appointments   Date Time Provider Sabrina Gonzalez   2021  8:50 AM Sourav Precise, [address]

## 2021-06-28 ENCOUNTER — NURSE ONLY (OUTPATIENT)
Dept: FAMILY MEDICINE CLINIC | Age: 51
End: 2021-06-28
Payer: MEDICARE

## 2021-06-28 DIAGNOSIS — F41.9 ANXIETY: Primary | ICD-10-CM

## 2021-06-28 DIAGNOSIS — F41.0 PANIC ATTACKS: ICD-10-CM

## 2021-06-28 DIAGNOSIS — L98.9 SKIN LESIONS: ICD-10-CM

## 2021-06-28 PROCEDURE — 99213 OFFICE O/P EST LOW 20 MIN: CPT | Performed by: NURSE PRACTITIONER

## 2021-06-28 RX ORDER — CLONAZEPAM 0.5 MG/1
0.5 TABLET ORAL 2 TIMES DAILY PRN
Qty: 30 TABLET | Refills: 0 | Status: SHIPPED | OUTPATIENT
Start: 2021-07-12 | End: 2021-08-05 | Stop reason: SDUPTHER

## 2021-06-28 NOTE — PROGRESS NOTES
2021    TELEHEALTH EVALUATION -- Audio/Visual (During DYOIW-59 public health emergency)    Due to [place] 19 outbreak, patient's office visit was converted to a virtual visit. Patient was contacted and agreed to proceed with a virtual visit via Doxy. me  The risks and benefits of converting to a virtual visit were discussed in light of the current infectious disease epidemic. Patient also understood that insurance coverage and co-pays are up to their individual insurance plans. Francisco Canseco, was evaluated through a synchronous (real-time) audio-video encounter. The patient (or guardian if applicable) is aware that this is a billable service. Verbal consent to proceed has been obtained within the past 12 months. The visit was conducted pursuant to the emergency declaration under the [address] [place] [address], [address] [place] [address] authority and the [employer] General Act. Patient identification was verified, and a caregiver was present when appropriate. The patient was located in a state where the provider was credentialed to provide care. Total time spent for this encounter: Not billed by time    The patient is talking with me virtually from her home and I am located at my office in [facility]. HPI:    Francisco Canseco (:  1970) has requested an audio/video evaluation for the following concern(s): Anxiety: She states that she continues to take Klonopin most days but tries to take 1/2 tablet for each dose. Some days does not take any at all. She states overall her mood has been stable but she does tend to get situational anxiety/panic symptoms. The medication has been helpful in reducing the symptoms. She does not report any significantly down or depressed moods. No thoughts of self-harm or harm to others.     Skin lesions: She states that she used to follow with dermatology routinely but it has been several years since she has done this. She has a few different skin lesions on her body that she would like to have assessed and removed if needed. She does not have a preference for dermatologist.        Prior to Visit Medications    Medication Sig Taking? Authorizing Provider   clonazePAM (KLONOPIN) 0.5 MG tablet Take 1 tablet by mouth 2 times daily as needed for Anxiety for up to 30 days. Yes OANH Buckner CNP   fluticasone (FLONASE) 50 MCG/ACT nasal spray 2 sprays by Nasal route daily Yes OANH Buckner CNP       Social History     Tobacco Use    Smoking status: Current Every Day Smoker     Packs/day: 0.50    Smokeless tobacco: Never Used   Vaping Use    Vaping Use: Never used   Substance Use Topics    Alcohol use: Yes    Drug use: No       PHYSICAL EXAMINATION:  [ INSTRUCTIONS:  \"[x]\" Indicates a positive item  \"[]\" Indicates a negative item  -- DELETE ALL ITEMS NOT EXAMINED]  [x] Alert  [x] Oriented to person/place/time    [x] No apparent distress  [] Toxic appearing    [] Face flushed appearing [] Sclera clear  [] Lips are cyanotic      [x] Breathing appears normal  [] Appears tachypneic      [] Rash on visible skin    [x] Cranial Nerves II-XII grossly intact    [x] Motor grossly intact in visible upper extremities    [] Motor grossly intact in visible lower extremities    [x] Normal Mood  [] Anxious appearing    [] Depressed appearing  [] Confused appearing      [] Poor short term memory  [] Poor long term memory    [] OTHER:      Due to this being a TeleHealth encounter, evaluation of the following organ systems is limited: Vitals/Constitutional/EENT/Resp/CV/GI//MS/Neuro/Skin/Heme-Lymph-Imm. ASSESSMENT/PLAN:   Diagnosis Orders   1. Anxiety  clonazePAM (KLONOPIN) 0.5 MG tablet   2. Panic attacks  clonazePAM (KLONOPIN) 0.5 MG tablet   3. Skin lesions  Altagracia Cain MD, [facility]       Return in about 1 month (around 7/28/2021).      1. 2.  Refill of Klonopin given to be taken sparingly for situational anxiety/panic. Medication has been effective in reducing severity of symptoms and no significant side effects reported. 3.  Referral given to [address] [place] [address] dermatology per patient request for reported skin lesions that she would like to have evaluated. Controlled medication review:    Indication reviewed- anxiety  Reviewed that condition still requires assistance with current medication. Reviewed that condition impacts psychological and/or physical function in daily life. Reviewed that medication does indeed improve function/pain  Reviewed patient/provider roles in compliance. Reviewed acceptable alternatives (CBT/medication/exercise/therapy)  Reviewed medication agreement. Reviewed use, abuse and diversion of medications. Discussed need for random urine testing at least yearly. No significant interactions/side effects reported. Controlled Substance Monitoring:    Acute and Chronic Pain Monitoring:   RX Monitoring 6/28/2021   Attestation -   Periodic Controlled Substance Monitoring Possible medication side effects, risk of tolerance/dependence & alternative treatments discussed. ;No signs of potential drug abuse or diversion identified. ;Assessed functional status. Chronic Pain > 80 MEDD -       Please note this report has been partially produced using speech recognition software and may cause contain errors related to that system including grammar, punctuation and spelling as well as words and phrases that may seem inappropriate. If there are questions or concerns please feel free to contact me to clarify. An  electronic signature was used to authenticate this note.     --OANH Jaime - CNP on 6/28/2021 at 2:46 PM        Pursuant to the emergency declaration under the [facility] waiver authority and the [employer] General Act, this Virtual  Visit was conducted, with patient's

## 2021-07-27 ENCOUNTER — TELEPHONE (OUTPATIENT)
Dept: FAMILY MEDICINE CLINIC | Age: 51
End: 2021-07-27

## 2021-07-27 NOTE — TELEPHONE ENCOUNTER
----- Message from Constance Back sent at 7/26/2021 11:14 AM EDT -----  Subject: Message to Provider    QUESTIONS  Information for Provider? Patient needs to be scheduled for Agnes Pena for demonologist appointment.   ---------------------------------------------------------------------------  --------------  Patricia [employer]  What is the best way for the office to contact you? OK to leave message on   voicemail, OK to respond with electronic message via [employer] portal (only   for patients who have registered [employer] account)  Preferred Call Back Phone Number? [phone]  ---------------------------------------------------------------------------  --------------  SCRIPT ANSWERS  Relationship to Patient? Self  Specialty Confirmation? Primary Care  Are you having swelling in your throat or face? No  Are you having difficulty breathing? No  Have the symptoms worsened or spread in the last day? No  Are you having fevers (100.4), chills or sweats? No  Have you previously seen a provider for this?  Yes

## 2021-07-27 NOTE — TELEPHONE ENCOUNTER
----- Message from Dre Lanza sent at 7/26/2021 11:14 AM EDT -----  Subject: Message to Provider    QUESTIONS  Information for Provider? Patient needs to be scheduled for [facility] Pena for demonologist appointment.   ---------------------------------------------------------------------------  --------------  [employer]  What is the best way for the office to contact you? OK to leave message on   voicemail, OK to respond with electronic message via [employer] portal (only   for patients who have registered [employer] account)  Preferred Call Back Phone Number? [phone]  ---------------------------------------------------------------------------  --------------  SCRIPT ANSWERS  Relationship to Patient? Self  Specialty Confirmation? Primary Care  Are you having swelling in your throat or face? No  Are you having difficulty breathing? No  Have the symptoms worsened or spread in the last day? No  Are you having fevers (100.4), chills or sweats? No  Have you previously seen a provider for this?  Yes

## 2021-08-05 DIAGNOSIS — F41.0 PANIC ATTACKS: ICD-10-CM

## 2021-08-05 DIAGNOSIS — F41.9 ANXIETY: ICD-10-CM

## 2021-08-05 RX ORDER — CLONAZEPAM 0.5 MG/1
0.5 TABLET ORAL 2 TIMES DAILY PRN
Qty: 30 TABLET | Refills: 0 | Status: SHIPPED | OUTPATIENT
Start: 2021-08-05 | End: 2021-11-09 | Stop reason: SDUPTHER

## 2021-08-05 NOTE — TELEPHONE ENCOUNTER
Patient was running late for her virtual  appointment today and called at 2:30pm. I advised she would need to r/s due to being more than 15 min late. Patient wanted to r/s but provider out the rest of the month. Patient requesting refill. Please advise.

## 2021-10-12 ENCOUNTER — HOSPITAL ENCOUNTER (OUTPATIENT)
Dept: WOMENS IMAGING | Age: 51
Discharge: HOME | End: 2021-10-14
Payer: MEDICARE

## 2021-10-12 DIAGNOSIS — Z12.31 ENCOUNTER FOR SCREENING MAMMOGRAM FOR BREAST CANCER: ICD-10-CM

## 2021-10-12 DIAGNOSIS — R92.2 DENSE BREASTS: ICD-10-CM

## 2021-10-12 PROCEDURE — 77063 BREAST TOMOSYNTHESIS BI: CPT

## 2021-10-19 ENCOUNTER — OFFICE VISIT (OUTPATIENT)
Dept: FAMILY MEDICINE CLINIC | Age: 51
End: 2021-10-19
Payer: MEDICARE

## 2021-10-19 VITALS — RESPIRATION RATE: 12 BRPM | BODY MASS INDEX: 25.23 KG/M2 | WEIGHT: 157 LBS | HEIGHT: 66 IN

## 2021-10-19 DIAGNOSIS — L82.1 SEBORRHEIC KERATOSES: ICD-10-CM

## 2021-10-19 DIAGNOSIS — L85.3 DRY SKIN DERMATITIS: Primary | ICD-10-CM

## 2021-10-19 DIAGNOSIS — L57.0 ACTINIC KERATOSES: ICD-10-CM

## 2021-10-19 PROCEDURE — G8484 FLU IMMUNIZE NO ADMIN: HCPCS | Performed by: FAMILY MEDICINE

## 2021-10-19 PROCEDURE — 99214 OFFICE O/P EST MOD 30 MIN: CPT | Performed by: FAMILY MEDICINE

## 2021-10-19 PROCEDURE — 4004F PT TOBACCO SCREEN RCVD TLK: CPT | Performed by: FAMILY MEDICINE

## 2021-10-19 PROCEDURE — G8419 CALC BMI OUT NRM PARAM NOF/U: HCPCS | Performed by: FAMILY MEDICINE

## 2021-10-19 PROCEDURE — G8427 DOCREV CUR MEDS BY ELIG CLIN: HCPCS | Performed by: FAMILY MEDICINE

## 2021-10-19 PROCEDURE — 3017F COLORECTAL CA SCREEN DOC REV: CPT | Performed by: FAMILY MEDICINE

## 2021-10-19 PROCEDURE — 17000 DESTRUCT PREMALG LESION: CPT | Performed by: FAMILY MEDICINE

## 2021-10-19 ASSESSMENT — PATIENT HEALTH QUESTIONNAIRE - PHQ9
SUM OF ALL RESPONSES TO PHQ QUESTIONS 1-9: 0
2. FEELING DOWN, DEPRESSED OR HOPELESS: 0
1. LITTLE INTEREST OR PLEASURE IN DOING THINGS: 0
SUM OF ALL RESPONSES TO PHQ QUESTIONS 1-9: 0
SUM OF ALL RESPONSES TO PHQ9 QUESTIONS 1 & 2: 0
SUM OF ALL RESPONSES TO PHQ QUESTIONS 1-9: 0

## 2021-10-19 ASSESSMENT — ENCOUNTER SYMPTOMS: COLOR CHANGE: 1

## 2021-10-19 NOTE — PATIENT INSTRUCTIONS
Cryotherapy instructions    Post op instructions given. A printed copy provided. It is best to leave blisters alone if they form for the first 1-3 days to allow the desired damaged tissue (precancer lesion, wart, or whatever lesion is being removed) to separate from healthy tissue. The area should be covered with a bandage to prevent blister breakage and dirt exposure. The wounds should remain dry while there is a blister, therefore if this is a sweaty location like the foot you may need to change socks multiple times per day. When the blister(s) pop or patient removes the top as instructed between day 3-5, apply antibiotic (NOT triple antibiotic, one brand is Neosporin) ointment and a bandage to affected area(s). The ointment should be applied to the open area as long as it is not covered with skin. Exposed tissue is meant to be moist.      Once a scab is formed the patient may stop applying ointment. The scab may appear yellow while moist, don't confuse this with infection. If the wound is infection pus will drain from the site. If this treatment was for a large wart you may note that a plug of skin may fall out of the area that was treated. That is the center of the wart and it is appropriate for it to come out. If exposed skin remains, treat that area as you would a ruptured blister as mentioned above. Bacitracin sample supplied    The patient is to wash with gentle, mild, bland soaps that do not contain perfume or dye. Examples are \"Dove Sensitive Skin\" or \"Cetaphil facial wash\". Both of these can be used on face or body. The patient may use lotion in addition to the Triamcinolone cream if prescribed. Recommended lotions are Cetaphil lotion, Eucerin lotion, CeraVe lotion. It is always best to apply your lotion directly after washing and rinsing while the skin is still towel dried but damp.   For best results bathe in lukewarm water and apply lotion within 3 minutes of removed, clean the area with soap and water two times a day unless your doctor gives you different instructions. Don't use hydrogen peroxide or alcohol, which can slow healing. ? You may cover the wound with a thin layer of petroleum jelly, such as Vaseline, and a nonstick bandage. · If you see a change in a skin growth, contact your doctor. Look for:  ? A mole that bleeds. ? A fast-growing mole. ? A scaly or crusted growth on the skin. ? A sore that will not heal.  When should you call for help? Call your doctor now or seek immediate medical care if:    · You have an area of normal skin that suddenly changes in shape, size, or how it looks.     · Your skin is badly broken from scratching.     · You have signs of infection such as:  ? Pain, warmth, or swelling in your skin. ? Red streaks near a wound in your skin. ? Pus coming from a wound in your skin. ? A fever not due to the flu or other illness. Watch closely for changes in your health, and be sure to contact your doctor if:    · You do not get better as expected. Where can you learn more? Go to https://Brew SolutionspeCustomMade.Kunerango. org and sign in to your [employer]ue Report account. Enter I838 in the [facility] box to learn more about \"Seborrheic Keratosis: Care Instructions. \"     If you do not have an account, please click on the \"Sign Up Now\" link. Current as of: March 3, 2021               Content Version: 13.0  © 2006-2021 Healthwise, Incorporated. Care instructions adapted under license by [facility] ([facility]). If you have questions about a medical condition or this instruction, always ask your healthcare professional. [place] [address] any warranty or liability for your use of this information.

## 2021-10-19 NOTE — PROGRESS NOTES
Diagnosis Orders   1. Dry skin dermatitis     2. Actinic keratoses  CT DESTRUC PREMALIGNANT, FIRST LESION   3. Seborrheic keratoses         Return in about 1 year (around 10/19/2022) for 15 min skin check. Orders Placed This Encounter   Procedures     [address], FIRST LESION       Glenna Laureano was seen today for skin problem. Diagnoses and all orders for this visit:    Dry skin dermatitis    Actinic keratoses  -     CT DESTRUC PREMALIGNANT, FIRST LESION    Seborrheic keratoses        Return in about 1 year (around 10/19/2022) for 15 min skin check. Patient Instructions     Cryotherapy instructions    Post op instructions given. A printed copy provided. It is best to leave blisters alone if they form for the first 1-3 days to allow the desired damaged tissue (precancer lesion, wart, or whatever lesion is being removed) to separate from healthy tissue. The area should be covered with a bandage to prevent blister breakage and dirt exposure. The wounds should remain dry while there is a blister, therefore if this is a sweaty location like the foot you may need to change socks multiple times per day. When the blister(s) pop or patient removes the top as instructed between day 3-5, apply antibiotic (NOT triple antibiotic, one brand is Neosporin) ointment and a bandage to affected area(s). The ointment should be applied to the open area as long as it is not covered with skin. Exposed tissue is meant to be moist.      Once a scab is formed the patient may stop applying ointment. The scab may appear yellow while moist, don't confuse this with infection. If the wound is infection pus will drain from the site. If this treatment was for a large wart you may note that a plug of skin may fall out of the area that was treated. That is the center of the wart and it is appropriate for it to come out.   If exposed skin remains, treat that area as you would a ruptured blister as mentioned above. Bacitracin sample supplied    The patient is to wash with gentle, mild, bland soaps that do not contain perfume or dye. Examples are \"Dove Sensitive Skin\" or \"Cetaphil facial wash\". Both of these can be used on face or body. The patient may use lotion in addition to the Triamcinolone cream if prescribed. Recommended lotions are Cetaphil lotion, Eucerin lotion, CeraVe lotion. It is always best to apply your lotion directly after washing and rinsing while the skin is still towel dried but damp. For best results bathe in lukewarm water and apply lotion within 3 minutes of drying. Patient Education        Actinic Keratosis: Care Instructions  Your Care Instructions  Actinic keratosis is a skin growth caused by sun damage. It can turn into skin cancer, but this isn't common. Actinic keratoses, also called solar keratoses, are small red, brown, or skin-colored scaly patches. They are most common on the face, neck, hands, and forearms. Your doctor can remove these growths by freezing or scraping them off or by putting medicines on them. Follow-up care is a key part of your treatment and safety. Be sure to make and go to all appointments, and call your doctor if you are having problems. It's also a good idea to know your test results and keep a list of the medicines you take. How can you care for yourself at home? · If your doctor told you how to care for the treated area, follow your doctor's instructions. If you did not get instructions, follow this general advice:  ? Wash around the area with clean water 2 times a day. Don't use hydrogen peroxide or alcohol, which can slow healing. ? You may cover the area with a thin layer of petroleum jelly, such as Vaseline, and a nonstick bandage. ? Apply more petroleum jelly and replace the bandage as needed. To prevent actinic keratosis  · Always wear sunscreen on exposed skin.  Make sure to use a broad-spectrum sunscreen that has a sun protection factor (SPF) of 27 or higher. Use it every day, even when it is cloudy. · Wear long sleeves, a hat, and pants if you are going to be outdoors for a long time. · Avoid the sun between 10 a.m. and 4 p.m., the peak time for UV rays. · Do not use tanning booths or sunlamps. When should you call for help? Watch closely for changes in your health, and be sure to contact your doctor if:    · You have symptoms of infection, such as:  ? Increased pain, swelling, warmth, or redness. ? Red streaks leading from the area. ? Pus draining from the area. ? A fever. Where can you learn more? Go to https://eeGeopeThe Xmap Inc.eb.Accelerate Diagnostics. org and sign in to your [employer] account. Enter L364 in the Curse box to learn more about \"Actinic Keratosis: Care Instructions. \"     If you do not have an account, please click on the \"Sign Up Now\" link. Current as of: March 3, 2021               Content Version: 13.0  © 8955-4654 Diabeto. Care instructions adapted under license by [facility] ([facility]). If you have questions about a medical condition or this instruction, always ask your healthcare professional. [address] any warranty or liability for your use of this information. Patient Education        Seborrheic Keratosis: Care Instructions  Your Care Instructions  Seborrheic keratoses are raised skin growths that look scaly or warty. They usually look like they were stuck onto the skin. They most often grow in groups on the back or chest and are more common in older people. A seborrheic keratosis can be tan or dark brown. A seborrheic keratosis is not a mole and is almost always harmless. But it is still a good idea to check your skin regularly. Sometimes a seborrheic keratosis can itch. Scratching it can cause it to bleed and sometimes even scar. A seborrheic keratosis is removed only if it bothers you. The doctor will freeze it or scrape it off with a tool.  The doctor can also use a laser to remove a seborrheic keratosis. Treatment usually results in normal-looking skin, but it can leave a light or dark aditi or even a scar on the skin. Follow-up care is a key part of your treatment and safety. Be sure to make and go to all appointments, and call your doctor if you are having problems. It's also a good idea to know your test results and keep a list of the medicines you take. How can you care for yourself at home? · If clothing irritates your seborrheic keratosis, cover it with a bandage to prevent rubbing and bleeding. · If you have a seborrheic keratosis removed, clean the area with soap and water two times a day unless your doctor gives you different instructions. Don't use hydrogen peroxide or alcohol, which can slow healing. ? You may cover the wound with a thin layer of petroleum jelly, such as Vaseline, and a nonstick bandage. · If you see a change in a skin growth, contact your doctor. Look for:  ? A mole that bleeds. ? A fast-growing mole. ? A scaly or crusted growth on the skin. ? A sore that will not heal.  When should you call for help? Call your doctor now or seek immediate medical care if:    · You have an area of normal skin that suddenly changes in shape, size, or how it looks.     · Your skin is badly broken from scratching.     · You have signs of infection such as:  ? Pain, warmth, or swelling in your skin. ? Red streaks near a wound in your skin. ? Pus coming from a wound in your skin. ? A fever not due to the flu or other illness. Watch closely for changes in your health, and be sure to contact your doctor if:    · You do not get better as expected. Where can you learn more? Go to https://CareWireeb.edjing. org and sign in to your [employer]qu[employer] account. Enter V389 in the [facility] box to learn more about \"Seborrheic Keratosis: Care Instructions. \"     If you do not have an account, please click on the \"Sign Up Now\" link.   Current as of: March 3, 2021               Content Version: 13.0  © 0912-6891 Healthwise, Karma Snap. Care instructions adapted under license by [facility] ([facility]). If you have questions about a medical condition or this instruction, always ask your healthcare professional. Yue Olson any warranty or liability for your use of this information. Patient states current concerns are a spot in front of her left underarm and a patch of dry skin on her elbow. She does have a history of skin cancer but does not feel she has suspicious lesions at this time. Here for routine check      Current Outpatient Medications on File Prior to Visit   Medication Sig Dispense Refill    fluticasone (FLONASE) 50 MCG/ACT nasal spray 2 sprays by Nasal route daily 16 g 3    clonazePAM (KLONOPIN) 0.5 MG tablet Take 1 tablet by mouth 2 times daily as needed for Anxiety for up to 30 days. 30 tablet 0     No current facility-administered medications on file prior to visit. Patient has no known allergies. Review of Systems   Constitutional: Negative for chills and fever. Skin: Positive for color change. Allergic/Immunologic: Negative for environmental allergies, food allergies and immunocompromised state. Hematological: Negative for adenopathy. Does not bruise/bleed easily. Psychiatric/Behavioral: Negative for behavioral problems and sleep disturbance. Resp 12   Ht 5' 6\" (1.676 m)   Wt 157 lb (71.2 kg)   LMP 06/12/2017   BMI 25.34 kg/m²   Physical Exam  Constitutional:       General: She is not in acute distress. Appearance: Normal appearance. She is well-developed. She is not toxic-appearing. HENT:      Head: Normocephalic and atraumatic. Right Ear: Hearing and tympanic membrane normal.      Left Ear: Hearing and tympanic membrane normal.      Nose: Nose normal. No nasal deformity. Eyes:      General: Lids are normal.         Right eye: No discharge.          Left eye: No discharge. Conjunctiva/sclera: Conjunctivae normal.      Pupils: Pupils are equal, round, and reactive to light. Neck:      Thyroid: No thyroid mass or thyromegaly. Vascular: No JVD. Trachea: Trachea and phonation normal.   Cardiovascular:      Rate and Rhythm: Normal rate and regular rhythm. Pulmonary:      Effort: No accessory muscle usage or respiratory distress. Musculoskeletal:      Cervical back: Full passive range of motion without pain. Comments: FROM all large muscle groups and joints as witnessed when walking to exam table, getting on, and getting off the exam table. Skin:     General: Skin is warm and dry. Findings: No rash. Comments: Left elbow posterior aspect has hyperkeratotic skin folds. Left anterior axillary line has a 1 mm erythematous base rough white flake lesion    Patient also has a number of areas that have diffusely infrequently scattered stuck on appearing brown soft flake lesions   Neurological:      Mental Status: She is alert. Motor: No tremor or atrophy. Gait: Gait normal.   Psychiatric:         Speech: Speech normal.         Behavior: Behavior normal.         Thought Content: Thought content normal.           Procedure consent  The procedure was discussed with the patient. All questions were answered and alternative options discussed. The patient is aware of the risks of bleeding, infection, unsatisfactory scar result. Informed consent paperwork was signed by the patient. Liquid nitrogen was applied for 2-6 seconds to affected areas with thaw allowed between dosing for a total of 2 applications. Applied to 1 lesion(s). The patient tolerated the procedure well. Diagnosis Orders   1. Dry skin dermatitis     2. Actinic keratoses  UT DESTRUC PREMALIGNANT, FIRST LESION   3. Seborrheic keratoses         Return in about 1 year (around 10/19/2022) for 15 min skin check.     Orders Placed This Encounter   Procedures    [place] [address] [place] [address], FIRST LESION               Patient Instructions     Cryotherapy instructions    Post op instructions given. A printed copy provided. It is best to leave blisters alone if they form for the first 1-3 days to allow the desired damaged tissue (precancer lesion, wart, or whatever lesion is being removed) to separate from healthy tissue. The area should be covered with a bandage to prevent blister breakage and dirt exposure. The wounds should remain dry while there is a blister, therefore if this is a sweaty location like the foot you may need to change socks multiple times per day. When the blister(s) pop or patient removes the top as instructed between day 3-5, apply antibiotic (NOT triple antibiotic, one brand is Neosporin) ointment and a bandage to affected area(s). The ointment should be applied to the open area as long as it is not covered with skin. Exposed tissue is meant to be moist.      Once a scab is formed the patient may stop applying ointment. The scab may appear yellow while moist, don't confuse this with infection. If the wound is infection pus will drain from the site. If this treatment was for a large wart you may note that a plug of skin may fall out of the area that was treated. That is the center of the wart and it is appropriate for it to come out. If exposed skin remains, treat that area as you would a ruptured blister as mentioned above. Bacitracin sample supplied    The patient is to wash with gentle, mild, bland soaps that do not contain perfume or dye. Examples are \"Dove Sensitive Skin\" or \"Cetaphil facial wash\". Both of these can be used on face or body. The patient may use lotion in addition to the Triamcinolone cream if prescribed. Recommended lotions are Cetaphil lotion, Eucerin lotion, CeraVe lotion. It is always best to apply your lotion directly after washing and rinsing while the skin is still towel dried but damp.   For best results bathe in lukewarm water and apply lotion within 3 minutes of drying. Patient Education        Actinic Keratosis: Care Instructions  Your Care Instructions  Actinic keratosis is a skin growth caused by sun damage. It can turn into skin cancer, but this isn't common. Actinic keratoses, also called solar keratoses, are small red, brown, or skin-colored scaly patches. They are most common on the face, neck, hands, and forearms. Your doctor can remove these growths by freezing or scraping them off or by putting medicines on them. Follow-up care is a key part of your treatment and safety. Be sure to make and go to all appointments, and call your doctor if you are having problems. It's also a good idea to know your test results and keep a list of the medicines you take. How can you care for yourself at home? · If your doctor told you how to care for the treated area, follow your doctor's instructions. If you did not get instructions, follow this general advice:  ? Wash around the area with clean water 2 times a day. Don't use hydrogen peroxide or alcohol, which can slow healing. ? You may cover the area with a thin layer of petroleum jelly, such as Vaseline, and a nonstick bandage. ? Apply more petroleum jelly and replace the bandage as needed. To prevent actinic keratosis  · Always wear sunscreen on exposed skin. Make sure to use a broad-spectrum sunscreen that has a sun protection factor (SPF) of 30 or higher. Use it every day, even when it is cloudy. · Wear long sleeves, a hat, and pants if you are going to be outdoors for a long time. · Avoid the sun between 10 a.m. and 4 p.m., the peak time for UV rays. · Do not use tanning booths or sunlamps. When should you call for help? Watch closely for changes in your health, and be sure to contact your doctor if:    · You have symptoms of infection, such as:  ? Increased pain, swelling, warmth, or redness. ? Red streaks leading from the area.   ? Pus draining from the area. ? A fever. Where can you learn more? Go to https://chpepiceweb.B2Brev. org and sign in to your BrightQube account. Enter L364 in the [facility] box to learn more about \"Actinic Keratosis: Care Instructions. \"     If you do not have an account, please click on the \"Sign Up Now\" link. Current as of: March 3, 2021               Content Version: 13.0  © 2006-2021 Kenguru. Care instructions adapted under license by [facility] ([facility]). If you have questions about a medical condition or this instruction, always ask your healthcare professional. [place] [address] any warranty or liability for your use of this information. Patient Education        Seborrheic Keratosis: Care Instructions  Your Care Instructions  Seborrheic keratoses are raised skin growths that look scaly or warty. They usually look like they were stuck onto the skin. They most often grow in groups on the back or chest and are more common in older people. A seborrheic keratosis can be tan or dark brown. A seborrheic keratosis is not a mole and is almost always harmless. But it is still a good idea to check your skin regularly. Sometimes a seborrheic keratosis can itch. Scratching it can cause it to bleed and sometimes even scar. A seborrheic keratosis is removed only if it bothers you. The doctor will freeze it or scrape it off with a tool. The doctor can also use a laser to remove a seborrheic keratosis. Treatment usually results in normal-looking skin, but it can leave a light or dark aditi or even a scar on the skin. Follow-up care is a key part of your treatment and safety. Be sure to make and go to all appointments, and call your doctor if you are having problems. It's also a good idea to know your test results and keep a list of the medicines you take. How can you care for yourself at home?   · If clothing irritates your seborrheic keratosis, cover it with a bandage to prevent rubbing and bleeding. · If you have a seborrheic keratosis removed, clean the area with soap and water two times a day unless your doctor gives you different instructions. Don't use hydrogen peroxide or alcohol, which can slow healing. ? You may cover the wound with a thin layer of petroleum jelly, such as Vaseline, and a nonstick bandage. · If you see a change in a skin growth, contact your doctor. Look for:  ? A mole that bleeds. ? A fast-growing mole. ? A scaly or crusted growth on the skin. ? A sore that will not heal.  When should you call for help? Call your doctor now or seek immediate medical care if:    · You have an area of normal skin that suddenly changes in shape, size, or how it looks.     · Your skin is badly broken from scratching.     · You have signs of infection such as:  ? Pain, warmth, or swelling in your skin. ? Red streaks near a wound in your skin. ? Pus coming from a wound in your skin. ? A fever not due to the flu or other illness. Watch closely for changes in your health, and be sure to contact your doctor if:    · You do not get better as expected. Where can you learn more? Go to https://Research JournalistpeTeam Roboteb.Infogram. org and sign in to your CRAZE account. Enter J641 in the [facility] box to learn more about \"Seborrheic Keratosis: Care Instructions. \"     If you do not have an account, please click on the \"Sign Up Now\" link. Current as of: March 3, 2021               Content Version: 13.0  © 8057-9153 Healthwise, Incorporated. Care instructions adapted under license by [facility] ([facility]). If you have questions about a medical condition or this instruction, always ask your healthcare professional. [place] [address] any warranty or liability for your use of this information. DO NOT USE TRIPLE ANTIBIOTIC ON THE WOUND    It is best to leave blisters alone if they form.  They should be covered with a bandage to prevent blister breakage and dirt exposure. The wounds should remain dry while there is a blister, therefore if this is a sweaty location like the foot you may need to change socks multiple times per day. If blister(s) pop or patient pops with a sterilized needle, apply antibiotic (NOT triple antibiotic, one brand is Neosporin) ointment and a bandage to affected area(s). The ointment should be applied to the open area as long as it is not covered with skin. Exposed tissue is meant to be moist.  Once a scab formed she may stop applying ointment. If this treatment was for a large wart you may note that a plug of skin may fall out of the area that was treated. That is the center of the wart and it is appropriate for it to come out. If exposed skin remains, treat that area as you would a ruptured blister as mentioned above.

## 2021-11-09 ENCOUNTER — VIRTUAL VISIT (OUTPATIENT)
Dept: FAMILY MEDICINE CLINIC | Age: 51
End: 2021-11-09
Payer: MEDICARE

## 2021-11-09 DIAGNOSIS — F41.9 ANXIETY: Primary | ICD-10-CM

## 2021-11-09 DIAGNOSIS — F41.0 PANIC ATTACKS: ICD-10-CM

## 2021-11-09 DIAGNOSIS — J01.40 ACUTE NON-RECURRENT PANSINUSITIS: ICD-10-CM

## 2021-11-09 DIAGNOSIS — Z00.00 ROUTINE GENERAL MEDICAL EXAMINATION AT A HEALTH CARE FACILITY: ICD-10-CM

## 2021-11-09 PROCEDURE — 99213 OFFICE O/P EST LOW 20 MIN: CPT | Performed by: NURSE PRACTITIONER

## 2021-11-09 PROCEDURE — 3017F COLORECTAL CA SCREEN DOC REV: CPT | Performed by: NURSE PRACTITIONER

## 2021-11-09 PROCEDURE — G8427 DOCREV CUR MEDS BY ELIG CLIN: HCPCS | Performed by: NURSE PRACTITIONER

## 2021-11-09 RX ORDER — CLONAZEPAM 0.5 MG/1
0.5 TABLET ORAL 2 TIMES DAILY PRN
Qty: 30 TABLET | Refills: 2 | Status: SHIPPED | OUTPATIENT
Start: 2021-11-09 | End: 2022-02-22 | Stop reason: SDUPTHER

## 2021-11-09 RX ORDER — FLUTICASONE PROPIONATE 50 MCG
2 SPRAY, SUSPENSION (ML) NASAL DAILY
Qty: 16 G | Refills: 3 | Status: CANCELLED | OUTPATIENT
Start: 2021-11-09

## 2021-11-09 ASSESSMENT — PATIENT HEALTH QUESTIONNAIRE - PHQ9
SUM OF ALL RESPONSES TO PHQ QUESTIONS 1-9: 0
SUM OF ALL RESPONSES TO PHQ QUESTIONS 1-9: 0
1. LITTLE INTEREST OR PLEASURE IN DOING THINGS: 0
SUM OF ALL RESPONSES TO PHQ QUESTIONS 1-9: 0
2. FEELING DOWN, DEPRESSED OR HOPELESS: 0
SUM OF ALL RESPONSES TO PHQ9 QUESTIONS 1 & 2: 0

## 2021-11-09 NOTE — PROGRESS NOTES
2021    TELEHEALTH EVALUATION -- Audio/Visual (During TQMTM-59 public health emergency)    Due to COVID 19 outbreak, patient's office visit was converted to a virtual visit. Patient was contacted and agreed to proceed with a virtual visit via D[employer]. me  The risks and benefits of converting to a virtual visit were discussed in light of the current infectious disease epidemic. Patient also understood that insurance coverage and co-pays are up to their individual insurance plans. Darryl Gan, was evaluated through a synchronous (real-time) audio-video encounter. The patient (or guardian if applicable) is aware that this is a billable service. Verbal consent to proceed has been obtained within the past 12 months. The visit was conducted pursuant to the emergency declaration under the [address] [place] [address], [address] [place] [address] authority and the [employer] and [employer] General Act. Patient identification was verified, and a caregiver was present when appropriate. The patient was located in a state where the provider was credentialed to provide care. Total time spent for this encounter: Not billed by time    The patient is talking with me virtually from her home and I am located at my office in [facility]. HPI:    Darryl Gan (:  1970) has requested an audio/video evaluation for the following concern(s): Anxiety: She states that she has been taking Klonopin very sparingly and takes only a partial tablet at a time. Last refill was in August.  She states overall her mood is been stable with no significant fluctuations. No severe down or depressed thoughts. No severe panic attacks reported. She does not report any thoughts of self-harm or harm to others. Prior to Visit Medications    Medication Sig Taking?  Authorizing Provider   fluticasone (FLONASE) 50 MCG/ACT nasal spray 2 sprays by Nasal route daily Yes Britton Mata, APRN - CNP   clonazePAM (KLONOPIN) 0.5 MG tablet Take 1 tablet by mouth 2 times daily as needed for Anxiety for up to 90 days. Yes OANH Osborn CNP       Social History     Tobacco Use    Smoking status: Current Every Day Smoker     Packs/day: 0.50     Years: 25.00     Pack years: 12.50    Smokeless tobacco: Never Used   Vaping Use    Vaping Use: Never used   Substance Use Topics    Alcohol use: Yes    Drug use: No        PHYSICAL EXAMINATION:  [ INSTRUCTIONS:  \"[x]\" Indicates a positive item  \"[]\" Indicates a negative item  -- DELETE ALL ITEMS NOT EXAMINED]  [x] Alert  [x] Oriented to person/place/time    [x] No apparent distress  [] Toxic appearing    [] Face flushed appearing [] Sclera clear  [] Lips are cyanotic      [x] Breathing appears normal  [] Appears tachypneic      [] Rash on visible skin    [x] Cranial Nerves II-XII grossly intact    [x] Motor grossly intact in visible upper extremities    [] Motor grossly intact in visible lower extremities    [x] Normal Mood  [] Anxious appearing    [] Depressed appearing  [] Confused appearing      [] Poor short term memory  [] Poor long term memory    [] OTHER:      Due to this being a TeleHealth encounter, evaluation of the following organ systems is limited: Vitals/Constitutional/EENT/Resp/CV/GI//MS/Neuro/Skin/Heme-Lymph-Imm. ASSESSMENT/PLAN:   Diagnosis Orders   1. Anxiety  clonazePAM (KLONOPIN) 0.5 MG tablet   2. Panic attacks  clonazePAM (KLONOPIN) 0.5 MG tablet   3. Acute non-recurrent pansinusitis     4. Routine general medical examination at a health care facility  CBC Auto Differential    Comprehensive Metabolic Panel    Lipid Panel    Hemoglobin A1C       Return in about 4 months (around 3/9/2022). Continue sparing use of Klonopin for situational anxiety. Notify me if mood becomes unstable prior to next visit. Recommend screening lab prior to next visit and she plans to follow-up for the end of the year.       Controlled Substance Monitoring:    Acute and Chronic Pain Monitoring:   RX Monitoring 11/9/2021   Attestation -   Periodic Controlled Substance Monitoring Possible medication side effects, risk of tolerance/dependence & alternative treatments discussed. ;No signs of potential drug abuse or diversion identified. ;Assessed functional status. Chronic Pain > 80 MEDD -       Please note this report has been partially produced using speech recognition software and may cause contain errors related to that system including grammar, punctuation and spelling as well as words and phrases that may seem inappropriate. If there are questions or concerns please feel free to contact me to clarify. An  electronic signature was used to authenticate this note. --OANH Ley - CNP on 11/9/2021 at 1:01 PM        Pursuant to the emergency declaration under the [facility], 1135 waiver authority and the [employer] and [employer] Act, this Virtual  Visit was conducted, with patient's consent, to reduce the patient's risk of exposure to COVID-19 and provide continuity of care for an established patient. Services were provided through a video synchronous discussion virtually to substitute for in-person clinic visit.

## 2022-02-22 DIAGNOSIS — J01.40 ACUTE NON-RECURRENT PANSINUSITIS: ICD-10-CM

## 2022-02-22 DIAGNOSIS — F41.0 PANIC ATTACKS: ICD-10-CM

## 2022-02-22 DIAGNOSIS — F41.9 ANXIETY: ICD-10-CM

## 2022-02-22 RX ORDER — FLUTICASONE PROPIONATE 50 MCG
2 SPRAY, SUSPENSION (ML) NASAL DAILY
Qty: 16 G | Refills: 3 | Status: SHIPPED | OUTPATIENT
Start: 2022-02-22 | End: 2022-06-05 | Stop reason: SDUPTHER

## 2022-02-22 RX ORDER — CLONAZEPAM 0.5 MG/1
0.5 TABLET ORAL 2 TIMES DAILY PRN
Qty: 30 TABLET | Refills: 1 | Status: SHIPPED | OUTPATIENT
Start: 2022-02-22 | End: 2022-06-07 | Stop reason: SDUPTHER

## 2022-02-22 NOTE — TELEPHONE ENCOUNTER
Pharmacy requesting medication refill. Please approve or deny this request.    Rx requested:  Requested Prescriptions     Pending Prescriptions Disp Refills    fluticasone (FLONASE) 50 MCG/ACT nasal spray 16 g 3     Si sprays by Nasal route daily    clonazePAM (KLONOPIN) 0.5 MG tablet 30 tablet 2     Sig: Take 1 tablet by mouth 2 times daily as needed for Anxiety for up to 90 days. Last Office Visit:   2021      Next Visit Date:  No future appointments.

## 2022-02-22 NOTE — TELEPHONE ENCOUNTER
In office appt needed before next klonopin refill. I have given her enough to get on my schedule within 2 months.

## 2022-06-05 DIAGNOSIS — F41.9 ANXIETY: ICD-10-CM

## 2022-06-05 DIAGNOSIS — F41.0 PANIC ATTACKS: ICD-10-CM

## 2022-06-05 DIAGNOSIS — J01.40 ACUTE NON-RECURRENT PANSINUSITIS: ICD-10-CM

## 2022-06-06 NOTE — TELEPHONE ENCOUNTER
Patient is requesting medication refill. Please approve or deny this request.    Rx requested:  Requested Prescriptions     Pending Prescriptions Disp Refills    fluticasone (FLONASE) 50 MCG/ACT nasal spray 16 g 3     Si sprays by Nasal route daily    clonazePAM (KLONOPIN) 0.5 MG tablet 30 tablet 1     Sig: Take 1 tablet by mouth 2 times daily as needed for Anxiety for up to 60 days.          Last Office Visit:   2021      Next Visit Date:  Future Appointments   Date Time Provider Sabrina Gonzalez   2022  2:00 PM [place], [address]

## 2022-06-07 ENCOUNTER — TELEMEDICINE (OUTPATIENT)
Dept: FAMILY MEDICINE CLINIC | Age: 52
End: 2022-06-07
Payer: MEDICARE

## 2022-06-07 DIAGNOSIS — F17.200 CURRENT SMOKER: ICD-10-CM

## 2022-06-07 DIAGNOSIS — F41.0 PANIC ATTACKS: ICD-10-CM

## 2022-06-07 DIAGNOSIS — J30.9 ALLERGIC RHINITIS, UNSPECIFIED SEASONALITY, UNSPECIFIED TRIGGER: ICD-10-CM

## 2022-06-07 DIAGNOSIS — R09.82 POST-NASAL DRIP: ICD-10-CM

## 2022-06-07 DIAGNOSIS — F41.9 ANXIETY: Primary | ICD-10-CM

## 2022-06-07 DIAGNOSIS — J34.89 SINUS PRESSURE: ICD-10-CM

## 2022-06-07 DIAGNOSIS — J01.90 ACUTE BACTERIAL SINUSITIS: ICD-10-CM

## 2022-06-07 DIAGNOSIS — R05.3 CHRONIC COUGH: ICD-10-CM

## 2022-06-07 DIAGNOSIS — F41.9 ANXIETY: ICD-10-CM

## 2022-06-07 DIAGNOSIS — B96.89 ACUTE BACTERIAL SINUSITIS: ICD-10-CM

## 2022-06-07 DIAGNOSIS — J01.40 ACUTE NON-RECURRENT PANSINUSITIS: ICD-10-CM

## 2022-06-07 PROCEDURE — 3017F COLORECTAL CA SCREEN DOC REV: CPT | Performed by: NURSE PRACTITIONER

## 2022-06-07 PROCEDURE — G8427 DOCREV CUR MEDS BY ELIG CLIN: HCPCS | Performed by: NURSE PRACTITIONER

## 2022-06-07 PROCEDURE — 99214 OFFICE O/P EST MOD 30 MIN: CPT | Performed by: NURSE PRACTITIONER

## 2022-06-07 RX ORDER — CLONAZEPAM 0.5 MG/1
0.5 TABLET ORAL 2 TIMES DAILY PRN
Qty: 30 TABLET | Refills: 1 | Status: SHIPPED | OUTPATIENT
Start: 2022-06-07 | End: 2022-11-04

## 2022-06-07 RX ORDER — IPRATROPIUM BROMIDE 42 UG/1
2 SPRAY, METERED NASAL 4 TIMES DAILY
Qty: 1 EACH | Refills: 2 | Status: SHIPPED | OUTPATIENT
Start: 2022-06-07

## 2022-06-07 RX ORDER — CLONAZEPAM 0.5 MG/1
0.5 TABLET ORAL 2 TIMES DAILY PRN
Qty: 30 TABLET | Refills: 1 | OUTPATIENT
Start: 2022-06-07 | End: 2022-08-06

## 2022-06-07 RX ORDER — AMOXICILLIN 875 MG/1
875 TABLET, COATED ORAL 2 TIMES DAILY
Qty: 20 TABLET | Refills: 0 | Status: SHIPPED | OUTPATIENT
Start: 2022-06-07 | End: 2022-06-17

## 2022-06-07 RX ORDER — FLUTICASONE PROPIONATE 50 MCG
2 SPRAY, SUSPENSION (ML) NASAL DAILY
Qty: 16 G | Refills: 3 | Status: SHIPPED | OUTPATIENT
Start: 2022-06-07

## 2022-06-07 RX ORDER — FLUTICASONE PROPIONATE 50 MCG
2 SPRAY, SUSPENSION (ML) NASAL DAILY
Qty: 16 G | Refills: 3 | OUTPATIENT
Start: 2022-06-07

## 2022-06-07 SDOH — ECONOMIC STABILITY: FOOD INSECURITY: WITHIN THE PAST 12 MONTHS, YOU WORRIED THAT YOUR FOOD WOULD RUN OUT BEFORE YOU GOT MONEY TO BUY MORE.: PATIENT DECLINED

## 2022-06-07 SDOH — ECONOMIC STABILITY: FOOD INSECURITY: WITHIN THE PAST 12 MONTHS, THE FOOD YOU BOUGHT JUST DIDN'T LAST AND YOU DIDN'T HAVE MONEY TO GET MORE.: PATIENT DECLINED

## 2022-06-07 ASSESSMENT — SOCIAL DETERMINANTS OF HEALTH (SDOH): HOW HARD IS IT FOR YOU TO PAY FOR THE VERY BASICS LIKE FOOD, HOUSING, MEDICAL CARE, AND HEATING?: PATIENT DECLINED

## 2022-06-07 NOTE — PROGRESS NOTES
2022    TELEHEALTH EVALUATION -- Audio/Visual (During LWQWG-60 public health emergency)    Due to [place] 19 outbreak, patient's office visit was converted to a virtual visit. Patient was contacted and agreed to proceed with a virtual visit via [email or web address]. me  The risks and benefits of converting to a virtual visit were discussed in light of the current infectious disease epidemic. Patient also understood that insurance coverage and co-pays are up to their individual insurance plans. Mahesh Leiva, was evaluated through a synchronous (real-time) audio-video encounter. The patient (or guardian if applicable) is aware that this is a billable service, which includes applicable co-pays. This Virtual Visit was conducted with patient's (and/or legal guardian's) consent. The visit was conducted pursuant to the emergency declaration under the [facility], [facility] authority and the "[employer] and [employer] General Act. Patient identification was verified, and a caregiver was present when appropriate. The patient was located at Home: [address] [place] [address] [place] [address]. Provider was located at [facility] (Appt Dept): [facility] [address] Total time spent for this encounter: Not billed by time    HPI:    Mahesh Leiva (:  1970) has requested an audio/video evaluation for the following concern(s): Anxiety: States that overall her mood has been stable since her last visit. Admits that her stress levels have been higher lately. Her daughter moved in with her and she has been watching her 21 month old grandson often. She takes Klonopin very sparingly at the onset of panic attack symptoms and states that the medication has been effective in reducing physical symptoms of stress/panic. She does not report any recent down or depressed moods. No severe panic attacks.     Sinus pressure: symptoms started about a week ago. She states that her grandson was recently sick and was diagnosed with sinus infection and ear infection. She has ongoing allergy issues but currently has quite a bit of sinus pressure on both sides of the nose. Has not been able to blow a lot out. No fever or chills. No loss of taste or smell. She did take a COVID test yesterday that was negative. Chronic cough: She has cut down on amount of cigarette smoking. She has had ongoing allergic rhinitis and chronic post nasal drip and otherwise has had a dry cough ongoing for quite some time now. She denies any chest congestion or significant sputum production. No activity intolerance, chest tightness or wheezing reported. Review of Systems   Patient reports no chest pain or pressure. No heart palpitations. No shortness of breath or moist cough. No persistent diarrhea or constipation reported. No dark tarry or mucus reported in the stool. No blood in the stool. No flank pain. No hematuria or dysuria. No new onset headaches or visual changes. Prior to Visit Medications    Medication Sig Taking? Authorizing Provider   fluticasone (FLONASE) 50 MCG/ACT nasal spray 2 sprays by Nasal route daily Yes OANH Boles CNP   clonazePAM (KLONOPIN) 0.5 MG tablet Take 1 tablet by mouth 2 times daily as needed for Anxiety for up to 60 days. Yes OANH Boles CNP   amoxicillin (AMOXIL) 875 MG tablet Take 1 tablet by mouth 2 times daily for 10 days Yes OANH Boles CNP   ipratropium (ATROVENT) 0.06 % nasal spray 2 sprays by Each Nostril route 4 times daily Yes OANH Boles CNP       Social History     Tobacco Use    Smoking status: Current Every Day Smoker     Packs/day: 0.50     Years: 25.00     Pack years: 12.50    Smokeless tobacco: Never Used   Vaping Use    Vaping Use: Never used   Substance Use Topics    Alcohol use:  Yes    Drug use: No        PHYSICAL EXAMINATION:  [ INSTRUCTIONS:  \"[x]\" Indicates a positive item  \"[]\" Indicates a negative item  -- DELETE ALL ITEMS NOT EXAMINED]  [x] Alert  [x] Oriented to person/place/time    [x] No apparent distress  [] Toxic appearing    [] Face flushed appearing [] Sclera clear  [] Lips are cyanotic      [x] Breathing appears normal  [] Appears tachypneic      [] Rash on visible skin    [x] Cranial Nerves II-XII grossly intact    [x] Motor grossly intact in visible upper extremities    [] Motor grossly intact in visible lower extremities    [x] Normal Mood  [] Anxious appearing    [] Depressed appearing  [] Confused appearing      [] Poor short term memory  [] Poor long term memory    [] OTHER:      Due to this being a TeleHealth encounter, evaluation of the following organ systems is limited: Vitals/Constitutional/EENT/Resp/CV/GI//MS/Neuro/Skin/Heme-Lymph-Imm. ASSESSMENT/PLAN:   Diagnosis Orders   1. Anxiety  clonazePAM (KLONOPIN) 0.5 MG tablet   2. Panic attacks  clonazePAM (KLONOPIN) 0.5 MG tablet   3. Sinus pressure     4. Post-nasal drip  ipratropium (ATROVENT) 0.06 % nasal spray   5. Acute bacterial sinusitis  amoxicillin (AMOXIL) 875 MG tablet   6. Current smoker     7. Chronic cough     8. Allergic rhinitis, unspecified seasonality, unspecified trigger  ipratropium (ATROVENT) 0.06 % nasal spray         Return for Keep August appt in office for contract, urine testing- med review. .     .  2. Panic attack symptoms have been well controlled with sparing use of Klonopin. No medication side effects reported. Refill given. 3.  4.  5.  6.  7.  8.  Recommend trialing Atrovent nasal spray to help with chronic postnasal drip. Can also trial antihistamine for ongoing allergic rhinitis symptoms. She is encouraged to continue to decrease amount of cigarette smoking per day. Notify me if cough symptoms worsen. Recommend amoxicillin for suspected bacterial sinusitis. Notify me if symptoms do not improve.      The patient is instructed to take Probiotic tablets twice a day for the duration of antibiotic therapy and for 4 days after completion of antibiotics. This will help restore the good bacteria to your colon and prevent side effects of antibiotic therapy such as cramping and diarrhea. Probiotic tablets can be found at your local pharmacy over the counter. Ask your pharmacist if you need help finding tablets. Controlled Substance Monitoring:    Acute and Chronic Pain Monitoring:   RX Monitoring 6/7/2022   Attestation -   Periodic Controlled Substance Monitoring Possible medication side effects, risk of tolerance/dependence & alternative treatments discussed. ;No signs of potential drug abuse or diversion identified. ;Assessed functional status. Chronic Pain > 80 MEDD -       Please note this report has been partially produced using speech recognition software and may cause contain errors related to that system including grammar, punctuation and spelling as well as words and phrases that may seem inappropriate. If there are questions or concerns please feel free to contact me to clarify. An  electronic signature was used to authenticate this note. --OANH Babin - CNP on 6/7/2022 at 12:47 PM        Pursuant to the emergency declaration under the [facility], 1135 waiver authority and the [employer] and [employer] Act, this Virtual  Visit was conducted, with patient's consent, to reduce the patient's risk of exposure to COVID-19 and provide continuity of care for an established patient. Services were provided through a video synchronous discussion virtually to substitute for in-person clinic visit.

## 2022-06-07 NOTE — TELEPHONE ENCOUNTER
Appointment is needed within 90 days of controlled substance prescription authorization. I have last seen the patient 11-9-21. I can see the patient in the near future for a brief virtual visit and then ensure that a routine follow up is scheduled at next available, in office. Please find out when patient available for brief virtual visit.

## 2022-06-07 NOTE — TELEPHONE ENCOUNTER
Patient is requesting medication refill. Please approve or deny this request.    Rx requested:  Requested Prescriptions     Pending Prescriptions Disp Refills    clonazePAM (KLONOPIN) 0.5 MG tablet 30 tablet 1     Sig: Take 1 tablet by mouth 2 times daily as needed for Anxiety for up to 60 days.     fluticasone (FLONASE) 50 MCG/ACT nasal spray 16 g 3     Si sprays by Nasal route daily         Last Office Visit:   2021      Next Visit Date:  Future Appointments   Date Time Provider Sabrina Gonzalez   2022  2:00 PM Felipe Crews, [address]

## 2022-07-29 ENCOUNTER — PATIENT MESSAGE (OUTPATIENT)
Dept: FAMILY MEDICINE CLINIC | Age: 52
End: 2022-07-29

## 2022-07-29 ENCOUNTER — TELEMEDICINE (OUTPATIENT)
Dept: FAMILY MEDICINE CLINIC | Age: 52
End: 2022-07-29
Payer: MEDICARE

## 2022-07-29 DIAGNOSIS — R52 BODY ACHES: ICD-10-CM

## 2022-07-29 DIAGNOSIS — U07.1 COVID-19: Primary | ICD-10-CM

## 2022-07-29 DIAGNOSIS — J06.9 UPPER RESPIRATORY INFECTION WITH COUGH AND CONGESTION: ICD-10-CM

## 2022-07-29 DIAGNOSIS — R53.83 OTHER FATIGUE: ICD-10-CM

## 2022-07-29 DIAGNOSIS — J34.89 SINUS PRESSURE: ICD-10-CM

## 2022-07-29 PROCEDURE — 99213 OFFICE O/P EST LOW 20 MIN: CPT | Performed by: NURSE PRACTITIONER

## 2022-07-29 PROCEDURE — G8427 DOCREV CUR MEDS BY ELIG CLIN: HCPCS | Performed by: NURSE PRACTITIONER

## 2022-07-29 PROCEDURE — 3017F COLORECTAL CA SCREEN DOC REV: CPT | Performed by: NURSE PRACTITIONER

## 2022-07-29 RX ORDER — AZITHROMYCIN 250 MG/1
TABLET, FILM COATED ORAL
Qty: 6 TABLET | Refills: 0 | Status: SHIPPED | OUTPATIENT
Start: 2022-07-29 | End: 2022-08-03

## 2022-07-29 RX ORDER — PREDNISONE 20 MG/1
40 TABLET ORAL DAILY
Qty: 10 TABLET | Refills: 0 | Status: SHIPPED | OUTPATIENT
Start: 2022-07-29 | End: 2022-08-03

## 2022-07-29 ASSESSMENT — PATIENT HEALTH QUESTIONNAIRE - PHQ9
10. IF YOU CHECKED OFF ANY PROBLEMS, HOW DIFFICULT HAVE THESE PROBLEMS MADE IT FOR YOU TO DO YOUR WORK, TAKE CARE OF THINGS AT HOME, OR GET ALONG WITH OTHER PEOPLE: 0
3. TROUBLE FALLING OR STAYING ASLEEP: 0
9. THOUGHTS THAT YOU WOULD BE BETTER OFF DEAD, OR OF HURTING YOURSELF: 0
7. TROUBLE CONCENTRATING ON THINGS, SUCH AS READING THE NEWSPAPER OR WATCHING TELEVISION: 0
SUM OF ALL RESPONSES TO PHQ QUESTIONS 1-9: 0
2. FEELING DOWN, DEPRESSED OR HOPELESS: 0
SUM OF ALL RESPONSES TO PHQ QUESTIONS 1-9: 0
SUM OF ALL RESPONSES TO PHQ9 QUESTIONS 1 & 2: 0
5. POOR APPETITE OR OVEREATING: 0
SUM OF ALL RESPONSES TO PHQ QUESTIONS 1-9: 0
1. LITTLE INTEREST OR PLEASURE IN DOING THINGS: 0
SUM OF ALL RESPONSES TO PHQ QUESTIONS 1-9: 0
6. FEELING BAD ABOUT YOURSELF - OR THAT YOU ARE A FAILURE OR HAVE LET YOURSELF OR YOUR FAMILY DOWN: 0
4. FEELING TIRED OR HAVING LITTLE ENERGY: 0
8. MOVING OR SPEAKING SO SLOWLY THAT OTHER PEOPLE COULD HAVE NOTICED. OR THE OPPOSITE, BEING SO FIGETY OR RESTLESS THAT YOU HAVE BEEN MOVING AROUND A LOT MORE THAN USUAL: 0

## 2022-07-29 ASSESSMENT — COLUMBIA-SUICIDE SEVERITY RATING SCALE - C-SSRS
2. HAVE YOU ACTUALLY HAD ANY THOUGHTS OF KILLING YOURSELF?: NO
1. WITHIN THE PAST MONTH, HAVE YOU WISHED YOU WERE DEAD OR WISHED YOU COULD GO TO SLEEP AND NOT WAKE UP?: NO
6. HAVE YOU EVER DONE ANYTHING, STARTED TO DO ANYTHING, OR PREPARED TO DO ANYTHING TO END YOUR LIFE?: NO

## 2022-07-29 NOTE — PROGRESS NOTES
2022    TELEHEALTH EVALUATION -- Audio/Visual (During [place] [address] public health emergency)    Due to [place] 19 outbreak, patient's office visit was converted to a virtual visit. Patient was contacted and agreed to proceed with a virtual visit via "[employer]. me  The risks and benefits of converting to a virtual visit were discussed in light of the current infectious disease epidemic. Patient also understood that insurance coverage and co-pays are up to their individual insurance plans. Destiny Bell, was evaluated through a synchronous (real-time) audio-video encounter. The patient (or guardian if applicable) is aware that this is a billable service, which includes applicable co-pays. This Virtual Visit was conducted with patient's (and/or legal guardian's) consent. The visit was conducted pursuant to the emergency declaration under the [facility], [facility] and the [employer] and [employer] General Act. Patient identification was verified, and a caregiver was present when appropriate. The patient was located at Home: [address] [place] [address] [place] [address]. Provider was located at [facility] (Appt Dept): [place] [address]  [address]. Total time spent for this encounter: Not billed by time      Positive For Covid-19 Pt tested + on . Pt is having body aches, congestion, pressure behind eyesand fatigue. HPI:        Destiny Bell (:  1970) has requested an audio/video evaluation for the following concern(s):    Covid-19: Patient states that he developed viral symptoms earlier this week and tested positive for COVID. She states that her nasal congestion as well as body aches started to improve over the past day or so symptoms came back and are worse now. Feels a lot more sinus pressure and pain behind her eyes. No current fever but feels that she is intolerant to heat. No chills.   She denies any thick or purulent drainage from the nose but states that the congestion persist without much of any drainage other than postnasal drip. She is questioning if additional treatment may be indicated or antibiotic. No chest congestion/shortness of breath or severe cough. She reports feeling more tired than normal.  Generally just feels that she is still sick. Prior to Visit Medications    Medication Sig Taking? Authorizing Provider   azithromycin (ZITHROMAX) 250 MG tablet 500 mg on day 1, then 250 mg days 2-5. Yes OANH Van CNP   predniSONE (DELTASONE) 20 MG tablet Take 2 tablets by mouth in the morning for 5 days. Yes OANH Van CNP   fluticasone (FLONASE) 50 MCG/ACT nasal spray 2 sprays by Nasal route daily Yes OANH Van CNP   clonazePAM (KLONOPIN) 0.5 MG tablet Take 1 tablet by mouth 2 times daily as needed for Anxiety for up to 60 days.  Yes OANH Van CNP   ipratropium (ATROVENT) 0.06 % nasal spray 2 sprays by Each Nostril route 4 times daily Yes OANH Van CNP       Social History     Tobacco Use    Smoking status: Every Day     Packs/day: 0.50     Years: 25.00     Pack years: 12.50     Types: Cigarettes    Smokeless tobacco: Never   Vaping Use    Vaping Use: Never used   Substance Use Topics    Alcohol use: Yes    Drug use: No       PHYSICAL EXAMINATION:  [ INSTRUCTIONS:  \"[x]\" Indicates a positive item  \"[]\" Indicates a negative item  -- DELETE ALL ITEMS NOT EXAMINED]  [x] Alert  [x] Oriented to person/place/time    [x] No apparent distress  [] Toxic appearing    [] Face flushed appearing [] Sclera clear  [] Lips are cyanotic      [x] Breathing appears normal  [] Appears tachypneic      [] Rash on visible skin    [x] Cranial Nerves II-XII grossly intact    [] Motor grossly intact in visible upper extremities    [] Motor grossly intact in visible lower extremities    [x] Normal Mood  [] Anxious appearing    [] Depressed appearing  [] Confused appearing      [] Poor short term memory  [] Poor long term memory    [] OTHER:      Due to this being a TeleHealth encounter, evaluation of the following organ systems is limited: Vitals/Constitutional/EENT/Resp/CV/GI//MS/Neuro/Skin/Heme-Lymph-Imm. ASSESSMENT/PLAN:   Diagnosis Orders   1. COVID-19        2. Sinus pressure        3. Body aches        4. Other fatigue        5. Upper respiratory infection with cough and congestion  azithromycin (ZITHROMAX) 250 MG tablet    predniSONE (DELTASONE) 20 MG tablet            No follow-ups on file. Discussed option of antibiotic as well as prednisone taper. She will start with antibiotic and then trial prednisone taper if symptoms worsen. Recommend rest and hydration. Notify me of any significant changes in symptoms. The patient is instructed to take Probiotic tablets twice a day for the duration of antibiotic therapy and for 4 days after completion of antibiotics. This will help restore the good bacteria to your colon and prevent side effects of antibiotic therapy such as cramping and diarrhea. Probiotic tablets can be found at your local pharmacy over the counter. Ask your pharmacist if you need help finding tablets. Please note this report has been partially produced using speech recognition software and may cause contain errors related to that system including grammar, punctuation and spelling as well as words and phrases that may seem inappropriate. If there are questions or concerns please feel free to contact me to clarify. An  electronic signature was used to authenticate this note.     --OANH Purcell - CNP on 7/29/2022 at 11:02 AM        Pursuant to the emergency declaration under the [address] [place] [address], [address] [place] [address] and the [employer] and [employer] Act, this Virtual  Visit was conducted, with patient's consent, to reduce the patient's risk of exposure to COVID-19 and provide continuity of care for an established patient. Services were provided through a video synchronous discussion virtually to substitute for in-person clinic visit.

## 2022-07-29 NOTE — TELEPHONE ENCOUNTER
Patient called wanted to know if she can do a virtual visit with you Please call her and let her know. daphne

## 2022-11-04 ENCOUNTER — OFFICE VISIT (OUTPATIENT)
Dept: FAMILY MEDICINE CLINIC | Age: 52
End: 2022-11-04
Payer: MEDICARE

## 2022-11-04 VITALS
HEIGHT: 66 IN | OXYGEN SATURATION: 98 % | SYSTOLIC BLOOD PRESSURE: 134 MMHG | HEART RATE: 83 BPM | WEIGHT: 154.4 LBS | TEMPERATURE: 97.2 F | BODY MASS INDEX: 24.81 KG/M2 | RESPIRATION RATE: 14 BRPM | DIASTOLIC BLOOD PRESSURE: 78 MMHG

## 2022-11-04 DIAGNOSIS — G56.01 RIGHT CARPAL TUNNEL SYNDROME: Primary | ICD-10-CM

## 2022-11-04 DIAGNOSIS — M67.431 GANGLION CYST OF WRIST, RIGHT: ICD-10-CM

## 2022-11-04 PROCEDURE — 4004F PT TOBACCO SCREEN RCVD TLK: CPT | Performed by: NURSE PRACTITIONER

## 2022-11-04 PROCEDURE — 3017F COLORECTAL CA SCREEN DOC REV: CPT | Performed by: NURSE PRACTITIONER

## 2022-11-04 PROCEDURE — G8484 FLU IMMUNIZE NO ADMIN: HCPCS | Performed by: NURSE PRACTITIONER

## 2022-11-04 PROCEDURE — G8420 CALC BMI NORM PARAMETERS: HCPCS | Performed by: NURSE PRACTITIONER

## 2022-11-04 PROCEDURE — 99213 OFFICE O/P EST LOW 20 MIN: CPT | Performed by: NURSE PRACTITIONER

## 2022-11-04 PROCEDURE — G8427 DOCREV CUR MEDS BY ELIG CLIN: HCPCS | Performed by: NURSE PRACTITIONER

## 2022-11-04 ASSESSMENT — ENCOUNTER SYMPTOMS
ABDOMINAL PAIN: 0
SINUS PAIN: 0
NAUSEA: 0
WHEEZING: 0
SINUS PRESSURE: 0
DIARRHEA: 0
TROUBLE SWALLOWING: 0
SHORTNESS OF BREATH: 0
VOMITING: 0
COUGH: 0
SORE THROAT: 0
CONSTIPATION: 0
ABDOMINAL DISTENTION: 0
RHINORRHEA: 0
COLOR CHANGE: 0
CHEST TIGHTNESS: 0

## 2022-11-04 NOTE — PROGRESS NOTES
[address] Encounter      CHIEFCOMPLAINT       Chief Complaint   Patient presents with    Arm Pain     R arm pain x3 months. Pt states she also has lump on R wrist that has been present x3 months. Pt states the pain causes problems with range of motion. Nurses Notes reviewed and I agree except as noted in the HPI. HISTORY OF PRESENT ILLNESS   Piotr Das is a 47 y.o. female who presents   Arm Pain   The incident occurred more than 1 week ago (3months, started in elbow, pain down to wrist and hand, noticed small lump growing in wrist). The incident occurred at home. There was no injury mechanism. The pain is present in the right elbow, right hand and right wrist. The quality of the pain is described as aching. The pain does not radiate. The pain is mild. Pertinent negatives include no chest pain, muscle weakness, numbness or tingling. Nothing aggravates the symptoms. She has tried NSAIDs for the symptoms. The treatment provided mild relief. REVIEW OF SYSTEMS     Review of Systems   Constitutional:  Negative for activity change, appetite change, chills, diaphoresis, fatigue and fever. HENT:  Negative for congestion, ear pain, postnasal drip, rhinorrhea, sinus pressure, sinus pain, sore throat and trouble swallowing. Eyes:  Negative for visual disturbance. Respiratory:  Negative for cough, chest tightness, shortness of breath and wheezing. Cardiovascular:  Negative for chest pain and palpitations. Gastrointestinal:  Negative for abdominal distention, abdominal pain, constipation, diarrhea, nausea and vomiting. Genitourinary:  Negative for difficulty urinating, dysuria, flank pain, frequency and urgency. Musculoskeletal:  Positive for arthralgias, joint swelling (small lump in wrist) and myalgias. Negative for neck pain and neck stiffness. Skin:  Negative for color change and rash.    Neurological:  Negative for dizziness, tingling, tremors, seizures, syncope, speech difficulty, weakness, light-headedness, numbness and headaches. PAST MEDICAL HISTORY         Diagnosis Date    Allergic rhinitis 2/14/2019    Anxiety     ETOH abuse 11/15/2018    Gestational diabetes     With last child       SURGICAL HISTORY     Patient  has a past surgical history that includes Hysterectomy, vaginal and Hysterectomy. CURRENT MEDICATIONS       Previous Medications    CLONAZEPAM (KLONOPIN) 0.5 MG TABLET    Take 1 tablet by mouth 2 times daily as needed for Anxiety for up to 60 days. FLUTICASONE (FLONASE) 50 MCG/ACT NASAL SPRAY    2 sprays by Nasal route daily    IPRATROPIUM (ATROVENT) 0.06 % NASAL SPRAY    2 sprays by Each Nostril route 4 times daily       ALLERGIES     Patientis has No Known Allergies. FAMILY HISTORY     Patient's family history includes No Known Problems in her brother, father, maternal grandfather, maternal grandmother, mother, paternal grandfather, paternal grandmother, sister, and another family member. She was adopted. SOCIAL HISTORY     Patient  reports that she has been smoking cigarettes. She has a 12.50 pack-year smoking history. She has never used smokeless tobacco. She reports current alcohol use. She reports that she does not use drugs. PHYSICAL EXAM     ED TRIAGE VITALS  BP: 134/78, Temp: 97.2 °F (36.2 °C), Heart Rate: 83, Resp: 14, SpO2: 98 %  Physical Exam  Constitutional:       General: She is not in acute distress. Appearance: Normal appearance. She is normal weight. She is not ill-appearing, toxic-appearing or diaphoretic. HENT:      Head: Normocephalic and atraumatic. Right Ear: External ear normal.      Left Ear: External ear normal.   Eyes:      General:         Right eye: No discharge. Left eye: No discharge. Conjunctiva/sclera: Conjunctivae normal.      Pupils: Pupils are equal, round, and reactive to light. Cardiovascular:      Rate and Rhythm: Normal rate and regular rhythm.       Pulses: Normal pulses. Pulmonary:      Effort: Pulmonary effort is normal.   Musculoskeletal:         General: Normal range of motion. Right elbow: No swelling, deformity, effusion or lacerations. Normal range of motion. Tenderness present in lateral epicondyle. Right forearm: Normal.      Right wrist: Tenderness present. No swelling, lacerations, bony tenderness, snuff box tenderness or crepitus. Normal range of motion. Normal pulse. Right hand: Normal.        Arms:         Hands:       Cervical back: Normal range of motion and neck supple. No rigidity or tenderness. Skin:     General: Skin is warm and dry. Capillary Refill: Capillary refill takes less than 2 seconds. Neurological:      General: No focal deficit present. Mental Status: She is alert and oriented to person, place, and time. Mental status is at baseline. Cranial Nerves: No cranial nerve deficit. Sensory: No sensory deficit. Motor: No weakness. Coordination: Coordination normal.       DIAGNOSTICRESULTS   Labs:       IMAGING:    URGENT CARE COURSE:     Vitals:    11/04/22 1407   BP: 134/78   Site: Right Upper Arm   Position: Sitting   Cuff Size: Medium Adult   Pulse: 83   Resp: 14   Temp: 97.2 °F (36.2 °C)   TempSrc: Temporal   SpO2: 98%   Weight: 154 lb 6.4 oz (70 kg)   Height: 5' 6\" (1.676 m)         PROCEDURES:  None  FINAL IMPRESSION      1. Right carpal tunnel syndrome    2. Ganglion cyst of wrist, right        DISPOSITION/PLAN   DISPOSITION      PATIENT REFERRED TO:  Return in about 1 week (around 11/11/2022), or if symptoms worsen or fail to improve. DISCHARGE MEDICATIONS:  New Prescriptions    No medications on file     Cannot display discharge medications since this is not an admission.       OANH Cheng - CNP

## 2022-12-10 ENCOUNTER — OFFICE VISIT (OUTPATIENT)
Dept: FAMILY MEDICINE CLINIC | Age: 52
End: 2022-12-10
Payer: MEDICARE

## 2022-12-10 VITALS
OXYGEN SATURATION: 99 % | WEIGHT: 154.6 LBS | DIASTOLIC BLOOD PRESSURE: 80 MMHG | TEMPERATURE: 97.2 F | SYSTOLIC BLOOD PRESSURE: 122 MMHG | HEART RATE: 71 BPM | BODY MASS INDEX: 24.95 KG/M2

## 2022-12-10 DIAGNOSIS — J40 BRONCHITIS: Primary | ICD-10-CM

## 2022-12-10 PROCEDURE — G8420 CALC BMI NORM PARAMETERS: HCPCS | Performed by: PHYSICIAN ASSISTANT

## 2022-12-10 PROCEDURE — G8484 FLU IMMUNIZE NO ADMIN: HCPCS | Performed by: PHYSICIAN ASSISTANT

## 2022-12-10 PROCEDURE — 3017F COLORECTAL CA SCREEN DOC REV: CPT | Performed by: PHYSICIAN ASSISTANT

## 2022-12-10 PROCEDURE — 4004F PT TOBACCO SCREEN RCVD TLK: CPT | Performed by: PHYSICIAN ASSISTANT

## 2022-12-10 PROCEDURE — 99212 OFFICE O/P EST SF 10 MIN: CPT | Performed by: PHYSICIAN ASSISTANT

## 2022-12-10 PROCEDURE — G8427 DOCREV CUR MEDS BY ELIG CLIN: HCPCS | Performed by: PHYSICIAN ASSISTANT

## 2022-12-10 RX ORDER — BENZONATATE 200 MG/1
200 CAPSULE ORAL 3 TIMES DAILY PRN
Qty: 12 CAPSULE | Refills: 0 | Status: SHIPPED | OUTPATIENT
Start: 2022-12-10 | End: 2022-12-14

## 2022-12-10 RX ORDER — AZITHROMYCIN 250 MG/1
250 TABLET, FILM COATED ORAL SEE ADMIN INSTRUCTIONS
Qty: 6 TABLET | Refills: 0 | Status: SHIPPED | OUTPATIENT
Start: 2022-12-10 | End: 2022-12-15

## 2022-12-10 ASSESSMENT — ENCOUNTER SYMPTOMS
ABDOMINAL PAIN: 0
TROUBLE SWALLOWING: 0
RHINORRHEA: 1
WHEEZING: 0
SHORTNESS OF BREATH: 0
STRIDOR: 0
VOICE CHANGE: 0
COUGH: 1

## 2022-12-10 NOTE — PROGRESS NOTES
[address] Encounter  CHIEF COMPLAINT       Chief Complaint   Patient presents with    Cough     Neg coivd test x3 days ago, hx of chronic bronchitis        HISTORY OF PRESENT ILLNESS   Jade Fernandez is a 47 y.o. female who presents with:  61-year-old female with cough for the past 4 days, home COVID test was negative. States same symptoms as previous bronchitis. Usually gets antibiotic. No shortness of breath. She is a smoker. Nothing making symptoms better or worse no other associated symptoms or complaints. REVIEW OF SYSTEMS     Review of Systems   Constitutional:  Negative for fever. HENT:  Positive for rhinorrhea. Negative for trouble swallowing and voice change. Eyes:  Negative for visual disturbance. Respiratory:  Positive for cough. Negative for shortness of breath, wheezing and stridor. Cardiovascular:  Negative for chest pain. Gastrointestinal:  Negative for abdominal pain. Genitourinary:  Negative for dysuria. Musculoskeletal:  Negative for arthralgias. Skin:  Negative for rash. Allergic/Immunologic: Negative for immunocompromised state. Neurological:  Negative for headaches. Hematological:  Negative for adenopathy. Psychiatric/Behavioral:  Negative for confusion. PAST MEDICAL HISTORY         Diagnosis Date    Allergic rhinitis 2/14/2019    Anxiety     ETOH abuse 11/15/2018    Gestational diabetes     With last child     SURGICAL HISTORY     Patient  has a past surgical history that includes Hysterectomy, vaginal and Hysterectomy. CURRENT MEDICATIONS       Previous Medications    CLONAZEPAM (KLONOPIN) 0.5 MG TABLET    Take 1 tablet by mouth 2 times daily as needed for Anxiety for up to 60 days. FLUTICASONE (FLONASE) 50 MCG/ACT NASAL SPRAY    2 sprays by Nasal route daily    IPRATROPIUM (ATROVENT) 0.06 % NASAL SPRAY    2 sprays by Each Nostril route 4 times daily     ALLERGIES     Patient is has No Known Allergies.   FAMILY HISTORY     Patient'sfamily history includes No Known Problems in her brother, father, maternal grandfather, maternal grandmother, mother, paternal grandfather, paternal grandmother, sister, and another family member. She was adopted. HISTORY     Patient  reports that she has been smoking cigarettes. She has a 12.50 pack-year smoking history. She has never used smokeless tobacco. She reports current alcohol use. She reports that she does not use drugs. PHYSICAL EXAM     VITALS  BP: 122/80, Temp: 97.2 °F (36.2 °C), Heart Rate: 71,  , SpO2: 99 %  Physical Exam  Nursing note reviewed. Constitutional:       Appearance: Normal appearance. She is normal weight. HENT:      Head: Normocephalic and atraumatic. Right Ear: Tympanic membrane normal.      Left Ear: Tympanic membrane normal.      Nose: Nose normal.      Mouth/Throat:      Mouth: Mucous membranes are moist.      Pharynx: No posterior oropharyngeal erythema. Eyes:      Extraocular Movements: Extraocular movements intact. Pupils: Pupils are equal, round, and reactive to light. Cardiovascular:      Rate and Rhythm: Normal rate and regular rhythm. Pulses: Normal pulses. Heart sounds: No murmur heard. Pulmonary:      Effort: Pulmonary effort is normal.      Breath sounds: Normal breath sounds. No wheezing. Musculoskeletal:         General: No swelling. Normal range of motion. Cervical back: Normal range of motion and neck supple. Skin:     General: Skin is warm and dry. Capillary Refill: Capillary refill takes less than 2 seconds. Neurological:      General: No focal deficit present. Mental Status: She is alert and oriented to person, place, and time. Psychiatric:         Mood and Affect: Mood normal.     READY CARE COURSE   Labs:  No results found for this visit on 12/10/22. IMAGING:  No orders to display     Scheduled Meds:  Continuous Infusions:  PRN Meds:. PROCEDURES:  FINAL IMPRESSION      1.  Bronchitis DISPOSITION/PLAN   MDM  61-year-old female well-appearing, vital stable, productive cough for 4 days yellow sputum, COVID test at home was negative. Lungs currently clear. Similar to previous episode of bronchitis. Will be covered with Z-Willie. Tessalon for cough. To be rechecked in 10 days. Discharge condition stable. PATIENT REFERRED TO:  Return in about 10 days (around 12/20/2022). DISCHARGE MEDICATIONS:  New Prescriptions    AZITHROMYCIN (ZITHROMAX) 250 MG TABLET    Take 1 tablet by mouth See Admin Instructions for 5 days 500mg on day 1 followed by 250mg on days 2 - 5    BENZONATATE (TESSALON) 200 MG CAPSULE    Take 1 capsule by mouth 3 times daily as needed for Cough     Cannot display discharge medications since this is not an admission.        Rubén Merrill PA-C

## 2022-12-12 ENCOUNTER — TELEPHONE (OUTPATIENT)
Dept: ORTHOPEDIC SURGERY | Age: 52
End: 2022-12-12

## 2022-12-15 ENCOUNTER — OFFICE VISIT (OUTPATIENT)
Dept: ORTHOPEDIC SURGERY | Age: 52
End: 2022-12-15
Payer: MEDICARE

## 2022-12-15 ENCOUNTER — HOSPITAL ENCOUNTER (OUTPATIENT)
Dept: GENERAL RADIOLOGY | Age: 52
Discharge: HOME | End: 2022-12-17
Payer: MEDICARE

## 2022-12-15 DIAGNOSIS — R20.2 ARM PARESTHESIA, RIGHT: ICD-10-CM

## 2022-12-15 DIAGNOSIS — M67.431 GANGLION CYST OF WRIST, RIGHT: ICD-10-CM

## 2022-12-15 DIAGNOSIS — G56.21 CUBITAL TUNNEL SYNDROME ON RIGHT: Primary | ICD-10-CM

## 2022-12-15 DIAGNOSIS — M25.531 RIGHT WRIST PAIN: ICD-10-CM

## 2022-12-15 PROCEDURE — 4004F PT TOBACCO SCREEN RCVD TLK: CPT | Performed by: STUDENT IN AN ORGANIZED HEALTH CARE EDUCATION/TRAINING PROGRAM

## 2022-12-15 PROCEDURE — 73100 X-RAY EXAM OF WRIST: CPT

## 2022-12-15 PROCEDURE — G8427 DOCREV CUR MEDS BY ELIG CLIN: HCPCS | Performed by: STUDENT IN AN ORGANIZED HEALTH CARE EDUCATION/TRAINING PROGRAM

## 2022-12-15 PROCEDURE — G8420 CALC BMI NORM PARAMETERS: HCPCS | Performed by: STUDENT IN AN ORGANIZED HEALTH CARE EDUCATION/TRAINING PROGRAM

## 2022-12-15 PROCEDURE — 99204 OFFICE O/P NEW MOD 45 MIN: CPT | Performed by: STUDENT IN AN ORGANIZED HEALTH CARE EDUCATION/TRAINING PROGRAM

## 2022-12-15 PROCEDURE — G8484 FLU IMMUNIZE NO ADMIN: HCPCS | Performed by: STUDENT IN AN ORGANIZED HEALTH CARE EDUCATION/TRAINING PROGRAM

## 2022-12-15 PROCEDURE — 3017F COLORECTAL CA SCREEN DOC REV: CPT | Performed by: STUDENT IN AN ORGANIZED HEALTH CARE EDUCATION/TRAINING PROGRAM

## 2022-12-15 NOTE — PROGRESS NOTES
Orthopedic Surgery and Sports Medicine    Subjective:      Patient ID: Raul Kitchen is a 47 y.o. female who presents today for:  Chief Complaint   Patient presents with    New Patient     Here for exam of the RT wrist, possible carpal tunnel and has a cyst present on the Rt wrist as well, ongoing for 4 months and the cyst has been there that long as well, x-rays in Epic       HPI  Natalee White is a 55-year-old female who presents today for evaluation of her right upper extremity. She reports numbness in the right upper extremity for approximately 4 months. She denies any injury. She states that she was seen at the walk-in clinic and told that she had carpal tunnel syndrome. However she feels this pain/numbness from the neck and shoulder past the elbow and into the hand. She reports numbness mostly in the small finger and ring finger. She also endorses a lump/cyst on the radial aspect of the wrist for approximately 4 months as well. It is not painful. She has tried intermittent anti-inflammatory medication and she does have a wrist brace. She denies neck pain or stiffness. Previous treatment: brace  NSAIDs: Yes  Physical therapy: No    Hand Dominance: right  Workers Compensation:   Have you missed work for this issue? No  Is this issue being addressed under a worker's compensation claim?  No    Past Medical History:   Diagnosis Date    Allergic rhinitis 2/14/2019    Anxiety     ETOH abuse 11/15/2018    Gestational diabetes     With last child      Past Surgical History:   Procedure Laterality Date    HYSTERECTOMY (CERVIX STATUS UNKNOWN)      HYSTERECTOMY, VAGINAL       Social History     Socioeconomic History    Marital status:      Spouse name: Not on file    Number of children: Not on file    Years of education: Not on file    Highest education level: Not on file   Occupational History    Not on file   Tobacco Use    Smoking status: Every Day     Packs/day: 0.50     Years: 25.00     Pack years: 12. 50     Types: Cigarettes    Smokeless tobacco: Never   Vaping Use    Vaping Use: Never used   Substance and Sexual Activity    Alcohol use: Yes    Drug use: No    Sexual activity: Not on file     Comment: Vasectomy   Other Topics Concern    Not on file   Social History Narrative    Not on file     Social Determinants of Health     Financial Resource Strain: Unknown    Difficulty of Paying Living Expenses: Patient refused   Food Insecurity: Unknown    Worried About Running Out of Food in the Last Year: Patient refused    Ran Out of Food in the Last Year: Patient refused   Transportation Needs: Not on file   Physical Activity: Not on file   Stress: Not on file   Social Connections: Not on file   Intimate Partner Violence: Not on file   Housing Stability: Not on file     Family History   Adopted: Yes   Problem Relation Age of Onset    No Known Problems Mother     No Known Problems Father     No Known Problems Paternal Grandfather     No Known Problems Paternal Grandmother     No Known Problems Maternal Grandmother     No Known Problems Maternal Grandfather     No Known Problems Brother     No Known Problems Sister     No Known Problems Other     Breast Cancer Neg Hx     Cancer Neg Hx     Colon Cancer Neg Hx     Diabetes Neg Hx     Eclampsia Neg Hx     Hypertension Neg Hx     Ovarian Cancer Neg Hx      Labor Neg Hx     Spont Abortions Neg Hx     Stroke Neg Hx      No Known Allergies  Current Outpatient Medications on File Prior to Visit   Medication Sig Dispense Refill    fluticasone (FLONASE) 50 MCG/ACT nasal spray 2 sprays by Nasal route daily 16 g 3    ipratropium (ATROVENT) 0.06 % nasal spray 2 sprays by Each Nostril route 4 times daily 1 each 2    clonazePAM (KLONOPIN) 0.5 MG tablet Take 1 tablet by mouth 2 times daily as needed for Anxiety for up to 60 days. 30 tablet 1     No current facility-administered medications on file prior to visit. Review of Systems   Constitutional: Negative.     HENT: Negative. Eyes: Negative. Respiratory: Negative. Cardiovascular: Negative. Gastrointestinal: Negative. Endocrine: Negative. Genitourinary: Negative. Musculoskeletal: Negative. Skin: Negative. Allergic/Immunologic: Negative. Neurological:  Positive for numbness. Hematological: Negative. Psychiatric/Behavioral: Negative. Objective:   LMP 06/12/2017     Physical Exam:  Ortho Exam    Physical exam of the cervical spine:  full ROM and no palpable tenderness  Negative radiculopathy. Physical exam of the right shoulder: There is not swelling and deformity of the shoulder girdle. There is not a kailyn sign. There is not muscular atrophy present. There is not scapular winging. Negative sulcus sign. Active and passive range of motion of the shoulder is equal.    Active range of motion of the shoulder is normal. Forward flexion: 180. External rotation at side: 50. External rotation at 90 degrees abduction: 90. Internal rotation: T12. There is not pain with end ranges of motion. There is not crepitus. Strength: 5/5 deltoid. 5/5 biceps. 5/5 triceps. 5/5 supraspinatus. 5/5 infraspinatus. 5/5 teres minor. Negative Hornblower's sign. Negative external rotation lag sign. 5/5 subscapularis. Negative internal rotation lag sign. Negative belly press. Negative lift off test.   Special/provocative tests: Negative impingement signs. Neer impingement sign negative. Banks test negative. Negative Aftab's test. There is not pain with cross body adduction. Negative [facility] test. Negative Speed's test. Negative Yergason's sign. Instability: N/a  Sensation intact to light touch along the C4-T1 distribution: normal.   Radial pulse is normal and symmetric. Exam of the contralateral left shoulder: within normal limits. Right upper extremity: Skin intact. Brisk capillary refill to all digits.  Able to demonstrate finger flexion/extension/abduction/adduction of fingers and flexion/extension at IP joint of thumb. Wrist with 50 degrees extension, 50 degrees flexion. Reports sensation intact to light touch across all fingers/thumb/hand. Able to make a full composite fist and extend to neutral. No instability across the fingers hand or wrist. No catching or clicking noted. No tenderness at the anatomic snuffbox, TFCC, radiocarpal joint, thumb CMC. 5/5 APB strength. Negative tinel's at the carpal tunnel. Positive tinels at the cubital tunnel. Negative compression test, phalen's maneuver. 2pt discrimination <5mm in all fingers. There is a small approximately 1 cm mobile soft mass/cyst along the radial aspect of the wrist that is not tender. Radiographs and Laboratory Studies:     Diagnostic Imaging Studies:    3 view x-ray of the right wrist dated 12- was reviewed by me and demonstrates no acute fracture or arthritic changes. Laboratory Studies:   Lab Results   Component Value Date    WBC 6.5 08/05/2020    HGB 14.7 08/05/2020    HCT 43.6 08/05/2020    MCV 98.8 08/05/2020     08/05/2020     No results found for: Berniece Million  No results found for: CRP    Assessment:      Diagnosis Orders   1. Cubital tunnel syndrome on right        2. Arm paresthesia, right  EMG      3. Ganglion cyst of wrist, right          Myrtle Barrera is a 47 y.o. female with a history and exam consistent with right cubital tunnel syndrome and right wrist ganglion cyst. This is an acute uncomplicated injury. Plan:     I had a discussion with the patient regarding her exam, x-ray findings, diagnosis. I feel that her symptoms are most likely related to cubital tunnel syndrome. She has numbness that extends into the small and ring fingers. However she does report pain/numbness above the elbow in the upper arm which could also be related to her cervical spine. I would like to get an EMG of the right upper extremity for further delineation of her nerve symptoms.   We talked about the cyst on her right wrist.  Recommend continued observation at this time. She will follow-up with me after the EMG is complete for further discussion. Orders Placed This Encounter   Procedures    EMG     Standing Status:   Future     Standing Expiration Date:   2/13/2023     Order Specific Question:   Which body part? Answer:   Right upper extremity     Return in about 3 weeks (around 1/5/2023) for after EMG for review.       Vibha Mcnair MD

## 2022-12-16 ASSESSMENT — ENCOUNTER SYMPTOMS
RESPIRATORY NEGATIVE: 1
EYES NEGATIVE: 1
GASTROINTESTINAL NEGATIVE: 1
ALLERGIC/IMMUNOLOGIC NEGATIVE: 1

## 2023-01-24 ENCOUNTER — OFFICE VISIT (OUTPATIENT)
Dept: FAMILY MEDICINE CLINIC | Age: 53
End: 2023-01-24
Payer: MEDICARE

## 2023-01-24 VITALS
HEIGHT: 66 IN | WEIGHT: 156 LBS | OXYGEN SATURATION: 96 % | BODY MASS INDEX: 25.07 KG/M2 | RESPIRATION RATE: 14 BRPM | HEART RATE: 84 BPM | DIASTOLIC BLOOD PRESSURE: 68 MMHG | TEMPERATURE: 97.2 F | SYSTOLIC BLOOD PRESSURE: 128 MMHG

## 2023-01-24 DIAGNOSIS — B36.9 OTOMYCOSIS OF RIGHT EAR: Primary | ICD-10-CM

## 2023-01-24 DIAGNOSIS — H62.41 OTOMYCOSIS OF RIGHT EAR: Primary | ICD-10-CM

## 2023-01-24 PROCEDURE — 4130F TOPICAL PREP RX AOE: CPT

## 2023-01-24 PROCEDURE — 99213 OFFICE O/P EST LOW 20 MIN: CPT

## 2023-01-24 PROCEDURE — 4004F PT TOBACCO SCREEN RCVD TLK: CPT

## 2023-01-24 PROCEDURE — G8419 CALC BMI OUT NRM PARAM NOF/U: HCPCS

## 2023-01-24 PROCEDURE — 3017F COLORECTAL CA SCREEN DOC REV: CPT

## 2023-01-24 PROCEDURE — G8427 DOCREV CUR MEDS BY ELIG CLIN: HCPCS

## 2023-01-24 PROCEDURE — G8484 FLU IMMUNIZE NO ADMIN: HCPCS

## 2023-01-24 RX ORDER — CLOTRIMAZOLE 1 %
CREAM (GRAM) TOPICAL
Qty: 28 EACH | Refills: 0 | Status: SHIPPED | OUTPATIENT
Start: 2023-01-24 | End: 2023-01-31

## 2023-01-24 ASSESSMENT — COLUMBIA-SUICIDE SEVERITY RATING SCALE - C-SSRS
6. HAVE YOU EVER DONE ANYTHING, STARTED TO DO ANYTHING, OR PREPARED TO DO ANYTHING TO END YOUR LIFE?: NO
1. WITHIN THE PAST MONTH, HAVE YOU WISHED YOU WERE DEAD OR WISHED YOU COULD GO TO SLEEP AND NOT WAKE UP?: NO
2. HAVE YOU ACTUALLY HAD ANY THOUGHTS OF KILLING YOURSELF?: NO

## 2023-01-24 ASSESSMENT — PATIENT HEALTH QUESTIONNAIRE - PHQ9
5. POOR APPETITE OR OVEREATING: 0
2. FEELING DOWN, DEPRESSED OR HOPELESS: 0
1. LITTLE INTEREST OR PLEASURE IN DOING THINGS: 0
4. FEELING TIRED OR HAVING LITTLE ENERGY: 0
SUM OF ALL RESPONSES TO PHQ QUESTIONS 1-9: 0
SUM OF ALL RESPONSES TO PHQ QUESTIONS 1-9: 0
10. IF YOU CHECKED OFF ANY PROBLEMS, HOW DIFFICULT HAVE THESE PROBLEMS MADE IT FOR YOU TO DO YOUR WORK, TAKE CARE OF THINGS AT HOME, OR GET ALONG WITH OTHER PEOPLE: 0
3. TROUBLE FALLING OR STAYING ASLEEP: 0
SUM OF ALL RESPONSES TO PHQ9 QUESTIONS 1 & 2: 0
6. FEELING BAD ABOUT YOURSELF - OR THAT YOU ARE A FAILURE OR HAVE LET YOURSELF OR YOUR FAMILY DOWN: 0
9. THOUGHTS THAT YOU WOULD BE BETTER OFF DEAD, OR OF HURTING YOURSELF: 0
SUM OF ALL RESPONSES TO PHQ QUESTIONS 1-9: 0
7. TROUBLE CONCENTRATING ON THINGS, SUCH AS READING THE NEWSPAPER OR WATCHING TELEVISION: 0
SUM OF ALL RESPONSES TO PHQ QUESTIONS 1-9: 0
8. MOVING OR SPEAKING SO SLOWLY THAT OTHER PEOPLE COULD HAVE NOTICED. OR THE OPPOSITE, BEING SO FIGETY OR RESTLESS THAT YOU HAVE BEEN MOVING AROUND A LOT MORE THAN USUAL: 0

## 2023-01-24 NOTE — PROGRESS NOTES
[facility] [address] Encounter        ASSESSMENT/PLAN     Cira Singleton is a 47 y.o. female who presents with:  Itching tenderness and fluid drainage that is clear from right ear ongoing for 2 months. Symptoms are present in the left ear as well or mild. Right ear there is scaly flaking skin just below the antitragus externally. There is annular appearance. Considerations for otomycosis versus nummular eczema. 1. Otomycosis of right ear  -     clotrimazole (LOTRIMIN AF) 1 % cream; Apply topically 2 times daily with Q tip. May take 2- 4 weeks of treatment to clear infection, Disp-28 each, R-0, Normal           PATIENT REFERRED TO:  Return if symptoms worsen or fail to improve. DISCHARGE MEDICATIONS:  New Prescriptions    CLOTRIMAZOLE (LOTRIMIN AF) 1 % CREAM    Apply topically 2 times daily with Q tip. May take 2- 4 weeks of treatment to clear infection     Cannot display discharge medications since this is not an admission. Long Velasquez, APRN - CNP    CHIEF COMPLAINT       Chief Complaint   Patient presents with    Ear Problem     R ear drainage, outside bilateral ear pain x2 months          SUBJECTIVE/REVIEW OF SYSTEMS     Review of Systems   Constitutional:  Negative for chills and fatigue. Eyes: Negative. Respiratory: Negative. Cardiovascular: Negative. Endocrine: Negative. Skin:  Positive for color change and rash. Negative for wound. Neurological:  Negative for numbness. Hematological:  Negative for adenopathy. Does not bruise/bleed easily. Psychiatric/Behavioral: Negative. OBJECTIVE/PHYSICAL EXAM     Physical Exam  Vitals reviewed. Constitutional:       General: She is not in acute distress. Appearance: Normal appearance. HENT:      Head: Normocephalic. Right Ear: Tympanic membrane normal. Drainage present. Tympanic membrane is not erythematous or bulging.       Left Ear: Tympanic membrane normal. Tympanic membrane is not erythematous or bulging. Ears:        Mouth/Throat:      Mouth: Mucous membranes are moist.   Eyes:      General:         Right eye: No discharge. Left eye: No discharge. Conjunctiva/sclera: Conjunctivae normal.   Cardiovascular:      Rate and Rhythm: Normal rate. Pulmonary:      Effort: Pulmonary effort is normal.   Musculoskeletal:         General: Normal range of motion. Skin:     General: Skin is warm. Capillary Refill: Capillary refill takes less than 2 seconds. Findings: No bruising, erythema, lesion or rash. Neurological:      Mental Status: She is alert and oriented to person, place, and time. Sensory: No sensory deficit. Psychiatric:         Mood and Affect: Mood normal.         Behavior: Behavior normal.       VITALS  BP: 128/68, Temp: 97.2 °F (36.2 °C), Temp Source: Temporal, Heart Rate: 84, Resp: 14, SpO2: 96 %      PAST MEDICAL HISTORY         Diagnosis Date    Allergic rhinitis 2/14/2019    Anxiety     ETOH abuse 11/15/2018    Gestational diabetes     With last child     SURGICAL HISTORY     Patient  has a past surgical history that includes Hysterectomy, vaginal and Hysterectomy. CURRENT MEDICATIONS       Previous Medications    CLONAZEPAM (KLONOPIN) 0.5 MG TABLET    Take 1 tablet by mouth 2 times daily as needed for Anxiety for up to 60 days. FLUTICASONE (FLONASE) 50 MCG/ACT NASAL SPRAY    2 sprays by Nasal route daily     ALLERGIES     Patient is has No Known Allergies. FAMILY HISTORY     Patient'sfamily history includes No Known Problems in her brother, father, maternal grandfather, maternal grandmother, mother, paternal grandfather, paternal grandmother, sister, and another family member. She was adopted. HISTORY     Patient  reports that she has been smoking cigarettes. She has a 12.50 pack-year smoking history. She has never used smokeless tobacco. She reports current alcohol use. She reports that she does not use drugs.   [facility] CARE COURSE   No orders of the defined types were placed in this encounter. Labs:  No results found for this visit on 01/24/23. IMAGING:  No orders to display     Scheduled Meds:  Continuous Infusions:  PRN Meds:.

## 2023-01-25 ASSESSMENT — ENCOUNTER SYMPTOMS
COLOR CHANGE: 1
EYES NEGATIVE: 1
RESPIRATORY NEGATIVE: 1

## 2023-01-30 ENCOUNTER — OFFICE VISIT (OUTPATIENT)
Dept: FAMILY MEDICINE CLINIC | Age: 53
End: 2023-01-30
Payer: MEDICARE

## 2023-01-30 VITALS
RESPIRATION RATE: 16 BRPM | DIASTOLIC BLOOD PRESSURE: 70 MMHG | SYSTOLIC BLOOD PRESSURE: 128 MMHG | HEIGHT: 66 IN | OXYGEN SATURATION: 97 % | BODY MASS INDEX: 25.43 KG/M2 | TEMPERATURE: 97.4 F | WEIGHT: 158.2 LBS | HEART RATE: 73 BPM

## 2023-01-30 DIAGNOSIS — F41.0 PANIC ATTACKS: ICD-10-CM

## 2023-01-30 DIAGNOSIS — Z00.00 PHYSICAL EXAM: Primary | ICD-10-CM

## 2023-01-30 PROBLEM — E78.2 MIXED HYPERLIPIDEMIA: Status: ACTIVE | Noted: 2023-01-30

## 2023-01-30 PROCEDURE — G8484 FLU IMMUNIZE NO ADMIN: HCPCS

## 2023-01-30 PROCEDURE — 99396 PREV VISIT EST AGE 40-64: CPT

## 2023-01-30 ASSESSMENT — ENCOUNTER SYMPTOMS
COLOR CHANGE: 0
NAUSEA: 0
SHORTNESS OF BREATH: 0
COUGH: 1
CHEST TIGHTNESS: 0
DIARRHEA: 0

## 2023-01-30 NOTE — PROGRESS NOTES
Saad Pa (: 1970) is a 47 y.o. female, New patient, who presents today for:    Chief Complaint   Patient presents with    New Patient     Patient presents today to establish care. Established patient here for physical examination. Currently denies additional or new complaints. Rash on bilateral ears that she was treated for 1 week ago has mild improvement. ASSESSMENT/PLAN    1. Physical exam    Patient is generally well-appearing appropriate for stated age, not in distress, respirations unlabored. Answers questions appropriately , no confusion, normal affect. Exceptions to physical exam none. -     T4, Free; Future  -     Comprehensive Metabolic Panel; Future  -     CBC with Auto Differential; Future  -     Lipid Panel; Future  -     Hemoglobin A1C; Future  -     TSH; Future  -     Fecal DNA Colorectal cancer screening (Cologuard)  -     Patient provided education on smoking cessation. She is open to the idea not currently ready for medication. She has tried acupuncture patches and gum in the past    2. Panic attacks  Patient currently uses Klonopin as needed for panic attacks. Discussed high risks of chronic use of this medication. Advised patient preference is for other medication such as hydroxyzine. Provided patient education on how hydroxyzine works to calm and reduce anxiety. She declines prescription at this time. Advised her that if Klonopin refill is needed she would need psych referral.    Return in about 1 year (around 2024), or if symptoms worsen or fail to improve. SUBJECTIVE/OBJECTIVE:            Current Outpatient Medications on File Prior to Visit   Medication Sig Dispense Refill    clotrimazole (LOTRIMIN AF) 1 % cream Apply topically 2 times daily with Q tip.   May take 2- 4 weeks of treatment to clear infection 28 each 0    fluticasone (FLONASE) 50 MCG/ACT nasal spray 2 sprays by Nasal route daily 16 g 3    clonazePAM (KLONOPIN) 0.5 MG tablet Take 1 tablet by mouth 2 times daily as needed for Anxiety for up to 60 days. 30 tablet 1     No current facility-administered medications on file prior to visit. No Known Allergies     Review of Systems   Constitutional:  Negative for fatigue and unexpected weight change. HENT: Negative. Eyes:  Negative for visual disturbance. Respiratory:  Positive for cough (Chronic). Negative for chest tightness and shortness of breath. Cardiovascular:  Negative for chest pain, palpitations and leg swelling. Gastrointestinal:  Negative for diarrhea and nausea. Endocrine: Negative for polydipsia and polyuria. Genitourinary: Negative. Musculoskeletal: Negative. Skin:  Negative for color change. Neurological:  Negative for weakness, light-headedness, numbness and headaches. Hematological:  Does not bruise/bleed easily. Psychiatric/Behavioral:  Negative for confusion. The patient is not nervous/anxious. Vitals:  /70 (Site: Right Upper Arm, Position: Sitting, Cuff Size: Medium Adult)   Pulse 73   Temp 97.4 °F (36.3 °C) (Temporal)   Resp 16   Ht 5' 6\" (1.676 m)   Wt 158 lb 3.2 oz (71.8 kg)   LMP 06/12/2017   SpO2 97%   BMI 25.53 kg/m²     No results found for: LABA1C     The 10-year ASCVD risk score (Gucci DK, et al., 2019) is: 3.9%    Values used to calculate the score:      Age: 47 years      Sex: Female      Is Non- : No      Diabetic: No      Tobacco smoker: Yes      Systolic Blood Pressure: 719 mmHg      Is BP treated: No      HDL Cholesterol: 62 mg/dL      Total Cholesterol: 229 mg/dL     Physical Exam  Constitutional:       General: She is not in acute distress. Appearance: Normal appearance. HENT:      Head: Normocephalic. Nose: Nose normal.      Mouth/Throat:      Mouth: Mucous membranes are moist.   Eyes:      Conjunctiva/sclera: Conjunctivae normal.   Neck:      Thyroid: No thyroid mass, thyromegaly or thyroid tenderness. Cardiovascular:      Rate and Rhythm: Normal rate. Pulses: Normal pulses. Radial pulses are 2+ on the right side and 2+ on the left side. Heart sounds: Normal heart sounds, S1 normal and S2 normal. No murmur heard. No gallop. Pulmonary:      Effort: Pulmonary effort is normal.      Breath sounds: Normal breath sounds. No wheezing or rhonchi. Abdominal:      General: Bowel sounds are normal. There is no distension. Musculoskeletal:         General: No swelling, tenderness, deformity or signs of injury. Normal range of motion. Cervical back: Normal range of motion and neck supple. No rigidity. Right lower leg: No edema. Left lower leg: No edema. Lymphadenopathy:      Cervical: No cervical adenopathy. Right cervical: No superficial or posterior cervical adenopathy. Left cervical: No superficial cervical adenopathy. Skin:     General: Skin is warm and dry. Capillary Refill: Capillary refill takes less than 2 seconds. Coloration: Skin is not pale. Neurological:      General: No focal deficit present. Mental Status: She is alert and oriented to person, place, and time. Motor: No weakness. Coordination: Coordination normal.      Gait: Gait normal.   Psychiatric:         Mood and Affect: Mood normal.         Speech: Speech normal.         Behavior: Behavior normal.         Thought Content: Thought content normal.                     An electronic signature was used to authenticate this note.      OANH León - CNP

## 2023-03-26 ENCOUNTER — OFFICE VISIT (OUTPATIENT)
Dept: FAMILY MEDICINE CLINIC | Age: 53
End: 2023-03-26
Payer: MEDICARE

## 2023-03-26 VITALS
TEMPERATURE: 98.3 F | BODY MASS INDEX: 25.58 KG/M2 | HEIGHT: 66 IN | SYSTOLIC BLOOD PRESSURE: 130 MMHG | DIASTOLIC BLOOD PRESSURE: 78 MMHG | HEART RATE: 82 BPM | WEIGHT: 159.2 LBS | OXYGEN SATURATION: 97 %

## 2023-03-26 DIAGNOSIS — J01.90 ACUTE BACTERIAL SINUSITIS: Primary | ICD-10-CM

## 2023-03-26 DIAGNOSIS — H60.543 ECZEMA OF BOTH EXTERNAL EARS: ICD-10-CM

## 2023-03-26 DIAGNOSIS — R05.1 ACUTE COUGH: ICD-10-CM

## 2023-03-26 DIAGNOSIS — B96.89 ACUTE BACTERIAL SINUSITIS: Primary | ICD-10-CM

## 2023-03-26 PROCEDURE — G8419 CALC BMI OUT NRM PARAM NOF/U: HCPCS

## 2023-03-26 PROCEDURE — 99213 OFFICE O/P EST LOW 20 MIN: CPT

## 2023-03-26 PROCEDURE — 4130F TOPICAL PREP RX AOE: CPT

## 2023-03-26 PROCEDURE — G8484 FLU IMMUNIZE NO ADMIN: HCPCS

## 2023-03-26 PROCEDURE — G8427 DOCREV CUR MEDS BY ELIG CLIN: HCPCS

## 2023-03-26 PROCEDURE — 4004F PT TOBACCO SCREEN RCVD TLK: CPT

## 2023-03-26 PROCEDURE — 3017F COLORECTAL CA SCREEN DOC REV: CPT

## 2023-03-26 RX ORDER — BENZONATATE 100 MG/1
100-200 CAPSULE ORAL 3 TIMES DAILY PRN
Qty: 42 CAPSULE | Refills: 0 | Status: SHIPPED | OUTPATIENT
Start: 2023-03-26 | End: 2023-04-02

## 2023-03-26 RX ORDER — AMOXICILLIN AND CLAVULANATE POTASSIUM 875; 125 MG/1; MG/1
1 TABLET, FILM COATED ORAL 2 TIMES DAILY
Qty: 14 TABLET | Refills: 0 | Status: SHIPPED | OUTPATIENT
Start: 2023-03-26 | End: 2023-04-02

## 2023-03-26 RX ORDER — TRIAMCINOLONE ACETONIDE 1 MG/G
CREAM TOPICAL
Qty: 80 G | Refills: 0 | Status: SHIPPED | OUTPATIENT
Start: 2023-03-26 | End: 2023-04-09

## 2023-03-26 SDOH — ECONOMIC STABILITY: FOOD INSECURITY: WITHIN THE PAST 12 MONTHS, YOU WORRIED THAT YOUR FOOD WOULD RUN OUT BEFORE YOU GOT MONEY TO BUY MORE.: NEVER TRUE

## 2023-03-26 SDOH — ECONOMIC STABILITY: HOUSING INSECURITY
IN THE LAST 12 MONTHS, WAS THERE A TIME WHEN YOU DID NOT HAVE A STEADY PLACE TO SLEEP OR SLEPT IN A SHELTER (INCLUDING NOW)?: NO

## 2023-03-26 SDOH — ECONOMIC STABILITY: INCOME INSECURITY: HOW HARD IS IT FOR YOU TO PAY FOR THE VERY BASICS LIKE FOOD, HOUSING, MEDICAL CARE, AND HEATING?: NOT HARD AT ALL

## 2023-03-26 SDOH — ECONOMIC STABILITY: FOOD INSECURITY: WITHIN THE PAST 12 MONTHS, THE FOOD YOU BOUGHT JUST DIDN'T LAST AND YOU DIDN'T HAVE MONEY TO GET MORE.: NEVER TRUE

## 2023-03-26 ASSESSMENT — ENCOUNTER SYMPTOMS
EYE DISCHARGE: 0
SHORTNESS OF BREATH: 0
RHINORRHEA: 1
COUGH: 1
ABDOMINAL PAIN: 0
WHEEZING: 0
SORE THROAT: 0
EYE ITCHING: 0
CHEST TIGHTNESS: 0
NAUSEA: 0
EYE REDNESS: 0
VOMITING: 0

## 2023-03-26 NOTE — PROGRESS NOTES
Physical Exam  Vitals reviewed. Constitutional:       General: She is not in acute distress. HENT:      Head: Normocephalic and atraumatic. Right Ear: Tympanic membrane and ear canal normal.      Left Ear: Tympanic membrane and ear canal normal.      Ears:        Comments: White flaking skin to external ears bilaterally. Mild erythema at base. Nose: Mucosal edema and rhinorrhea present. Rhinorrhea is clear. Right Turbinates: Swollen. Left Turbinates: Swollen. Right Sinus: Maxillary sinus tenderness present. No frontal sinus tenderness. Left Sinus: Maxillary sinus tenderness present. No frontal sinus tenderness. Mouth/Throat:      Lips: Pink. Mouth: Mucous membranes are moist.      Pharynx: Oropharynx is clear. Uvula midline. No oropharyngeal exudate or posterior oropharyngeal erythema. Eyes:      General: Lids are normal.   Cardiovascular:      Rate and Rhythm: Normal rate and regular rhythm. Heart sounds: Normal heart sounds. Pulmonary:      Effort: Pulmonary effort is normal. No accessory muscle usage, respiratory distress or retractions. Breath sounds: Normal breath sounds and air entry. No stridor or decreased air movement. No decreased breath sounds, wheezing, rhonchi or rales. Musculoskeletal:      Cervical back: Normal range of motion. Skin:     General: Skin is warm. Neurological:      Mental Status: She is alert and oriented to person, place, and time. Mental status is at baseline. Psychiatric:         Behavior: Behavior is cooperative. Assessment & Plan   1. Acute bacterial sinusitis  -     amoxicillin-clavulanate (AUGMENTIN) 875-125 MG per tablet;  Take 1 tablet by mouth 2 times daily for 7 days, Disp-14 tablet, R-0Normal  -OTC medication for symptom control such as tylenol, motrin, sudafed, or mucinex  -Saline nasal spray  -Humidifier at night  -Hydration with water, broth, or non-caffeinated tea  -Discussed red flags for when to

## 2023-05-19 ENCOUNTER — TELEPHONE (OUTPATIENT)
Dept: GASTROENTEROLOGY | Age: 53
End: 2023-05-19

## 2023-05-19 ENCOUNTER — TELEPHONE (OUTPATIENT)
Dept: FAMILY MEDICINE CLINIC | Age: 53
End: 2023-05-19

## 2023-05-19 DIAGNOSIS — Z12.31 ENCOUNTER FOR SCREENING MAMMOGRAM FOR MALIGNANT NEOPLASM OF BREAST: Primary | ICD-10-CM

## 2023-10-23 ENCOUNTER — OFFICE VISIT (OUTPATIENT)
Dept: FAMILY MEDICINE CLINIC | Age: 53
End: 2023-10-23
Payer: MEDICAID

## 2023-10-23 VITALS
BODY MASS INDEX: 24.94 KG/M2 | TEMPERATURE: 97.4 F | DIASTOLIC BLOOD PRESSURE: 82 MMHG | WEIGHT: 154.5 LBS | HEART RATE: 72 BPM | OXYGEN SATURATION: 99 % | SYSTOLIC BLOOD PRESSURE: 130 MMHG

## 2023-10-23 DIAGNOSIS — J01.00 ACUTE MAXILLARY SINUSITIS, RECURRENCE NOT SPECIFIED: Primary | ICD-10-CM

## 2023-10-23 DIAGNOSIS — R22.2 MASS OF SUBCUTANEOUS TISSUE OF BACK: ICD-10-CM

## 2023-10-23 PROCEDURE — 99213 OFFICE O/P EST LOW 20 MIN: CPT

## 2023-10-23 RX ORDER — AMOXICILLIN AND CLAVULANATE POTASSIUM 875; 125 MG/1; MG/1
1 TABLET, FILM COATED ORAL 2 TIMES DAILY
Qty: 20 TABLET | Refills: 0 | Status: SHIPPED | OUTPATIENT
Start: 2023-10-23 | End: 2023-11-02

## 2023-10-23 ASSESSMENT — ENCOUNTER SYMPTOMS
EYES NEGATIVE: 1
SORE THROAT: 0
WHEEZING: 0
COUGH: 0
ABDOMINAL PAIN: 0
SINUS PAIN: 1
SHORTNESS OF BREATH: 0
RHINORRHEA: 0
SINUS PRESSURE: 1

## 2023-10-23 NOTE — PROGRESS NOTES
[facility]          ASSESSMENT/PLAN     Cash Zambrano is a 47 y.o. female who presents with:        1. Acute maxillary sinusitis, recurrence not specified  Reports sinus pain pressure postnasal drip and clicking sound in left ear symptoms first began 1 month ago where improving gradually for 1 week then remained stable for several weeks over the last several days sinus pressure symptoms have worsened. On examination there is bulging of the left ear TM and middle ear effusion is noted. Ear canal looks normal.  Right ear appears normal.  Pharynx is pink no tonsillar enlargement. Neck supple without masses lung sounds clear. -     amoxicillin-clavulanate (AUGMENTIN) 875-125 MG per tablet; Take 1 tablet by mouth 2 times daily for 10 days, Disp-20 tablet, R-0Normal  2. Mass of subcutaneous tissue of back  Reports mass to middle of her back noted by massage therapist.  It is nontender. On examination at the lower thoracic region there is a 1 and half centimeters soft movable mass patient denies tenderness there is no erythremia. It is located to the right side of the thoracic spine. Considering for lipoma versus cyst will obtain ultrasound. -     US SOFT TISSUE LIMITED AREA; Future           PATIENT REFERRED TO:  Return in about 4 weeks (around 11/20/2023) for PCP. DISCHARGE MEDICATIONS:  New Prescriptions    AMOXICILLIN-CLAVULANATE (AUGMENTIN) 875-125 MG PER TABLET    Take 1 tablet by mouth 2 times daily for 10 days     Cannot display discharge medications since this is not an admission. OANH Trujillo - CNP    CHIEF COMPLAINT       Chief Complaint   Patient presents with    Sinusitis     Onset beginning of October, x4 weeks          SUBJECTIVE/REVIEW OF SYSTEMS     Review of Systems   Constitutional:  Positive for fatigue. Negative for chills and fever. HENT:  Positive for congestion, sinus pressure and sinus pain. Negative for ear pain, rhinorrhea and sore throat.

## 2023-10-23 NOTE — PATIENT INSTRUCTIONS
[phone] for breast cancer screening. [phone] for colon cancer screening .  Have labs that were previously ordered performed

## 2023-11-07 ENCOUNTER — HOSPITAL ENCOUNTER (OUTPATIENT)
Dept: ULTRASOUND IMAGING | Age: 53
Discharge: HOME | End: 2023-11-09
Payer: MEDICAID

## 2023-11-07 ENCOUNTER — HOSPITAL ENCOUNTER (OUTPATIENT)
Dept: WOMENS IMAGING | Age: 53
Discharge: HOME | End: 2023-11-09
Payer: MEDICAID

## 2023-11-07 DIAGNOSIS — R22.2 MASS OF SUBCUTANEOUS TISSUE OF BACK: ICD-10-CM

## 2023-11-07 DIAGNOSIS — Z12.31 ENCOUNTER FOR SCREENING MAMMOGRAM FOR MALIGNANT NEOPLASM OF BREAST: ICD-10-CM

## 2023-11-07 PROCEDURE — 77063 BREAST TOMOSYNTHESIS BI: CPT

## 2023-11-07 PROCEDURE — 76999 ECHO EXAMINATION PROCEDURE: CPT

## 2023-12-21 ENCOUNTER — HOSPITAL ENCOUNTER (EMERGENCY)
Age: 53
Discharge: HOME | End: 2023-12-22
Attending: EMERGENCY MEDICINE | Admitting: EMERGENCY MEDICINE
Payer: MEDICAID

## 2023-12-21 DIAGNOSIS — F10.920 ACUTE ALCOHOLIC INTOXICATION WITHOUT COMPLICATION (HCC): Primary | ICD-10-CM

## 2023-12-21 LAB
ALBUMIN SERPL-MCNC: 5.1 G/DL (ref 3.5–4.6)
ALP SERPL-CCNC: 123 U/L (ref 40–130)
ALT SERPL-CCNC: 50 U/L (ref 0–33)
ANION GAP SERPL CALCULATED.3IONS-SCNC: 16 MEQ/L (ref 9–15)
AST SERPL-CCNC: 36 U/L (ref 0–35)
BASOPHILS # BLD: 0.1 K/UL (ref 0–0.1)
BASOPHILS NFR BLD: 1 % (ref 0.1–1.2)
BILIRUB SERPL-MCNC: <0.2 MG/DL (ref 0.2–0.7)
BUN SERPL-MCNC: 13 MG/DL (ref 6–20)
CALCIUM SERPL-MCNC: 9.7 MG/DL (ref 8.5–9.9)
CHLORIDE SERPL-SCNC: 106 MEQ/L (ref 95–107)
CO2 SERPL-SCNC: 22 MEQ/L (ref 20–31)
CREAT SERPL-MCNC: 0.62 MG/DL (ref 0.5–0.9)
EOSINOPHIL # BLD: 0.2 K/UL (ref 0–0.4)
EOSINOPHIL NFR BLD: 2.5 % (ref 0.7–5.8)
ERYTHROCYTE [DISTWIDTH] IN BLOOD BY AUTOMATED COUNT: 12.6 % (ref 11.7–14.4)
ETHANOL PERCENT: 0.33 G/DL
ETHANOLAMINE SERPL-MCNC: 377 MG/DL (ref 0–0.08)
GLOBULIN SER CALC-MCNC: 3 G/DL (ref 2.3–3.5)
GLUCOSE SERPL-MCNC: 168 MG/DL (ref 70–99)
HCT VFR BLD AUTO: 45.3 % (ref 37–47)
HGB BLD-MCNC: 15.1 G/DL (ref 11.2–15.7)
IMM GRANULOCYTES # BLD: 0 K/UL
IMM GRANULOCYTES NFR BLD: 0.3 %
LYMPHOCYTES # BLD: 3.9 K/UL (ref 1.2–3.7)
LYMPHOCYTES NFR BLD: 42.5 %
MCH RBC QN AUTO: 31.5 PG (ref 25.6–32.2)
MCHC RBC AUTO-ENTMCNC: 33.3 % (ref 32.2–35.5)
MCV RBC AUTO: 94.4 FL (ref 79.4–94.8)
MONOCYTES # BLD: 1 K/UL (ref 0.2–0.9)
MONOCYTES NFR BLD: 10.6 % (ref 4.7–12.5)
NEUTROPHILS # BLD: 3.9 K/UL (ref 1.6–6.1)
NEUTS SEG NFR BLD: 43.1 % (ref 34–71.1)
PLATELET # BLD AUTO: 209 K/UL (ref 182–369)
POTASSIUM SERPL-SCNC: 4.7 MEQ/L (ref 3.4–4.9)
PROT SERPL-MCNC: 8.1 G/DL (ref 6.3–8)
RBC # BLD AUTO: 4.8 M/UL (ref 3.93–5.22)
SODIUM SERPL-SCNC: 144 MEQ/L (ref 135–144)
WBC # BLD AUTO: 9.1 K/UL (ref 4–10)

## 2023-12-21 PROCEDURE — 99284 EMERGENCY DEPT VISIT MOD MDM: CPT

## 2023-12-21 PROCEDURE — 80053 COMPREHEN METABOLIC PANEL: CPT

## 2023-12-21 PROCEDURE — 36415 COLL VENOUS BLD VENIPUNCTURE: CPT

## 2023-12-21 PROCEDURE — 82077 ASSAY SPEC XCP UR&BREATH IA: CPT

## 2023-12-21 PROCEDURE — 6360000002 HC RX W HCPCS: Performed by: EMERGENCY MEDICINE

## 2023-12-21 PROCEDURE — 96374 THER/PROPH/DIAG INJ IV PUSH: CPT

## 2023-12-21 PROCEDURE — 2580000003 HC RX 258: Performed by: EMERGENCY MEDICINE

## 2023-12-21 PROCEDURE — 85025 COMPLETE CBC W/AUTO DIFF WBC: CPT

## 2023-12-21 RX ORDER — ONDANSETRON 2 MG/ML
4 INJECTION INTRAMUSCULAR; INTRAVENOUS ONCE
Status: COMPLETED | OUTPATIENT
Start: 2023-12-21 | End: 2023-12-21

## 2023-12-21 RX ORDER — 0.9 % SODIUM CHLORIDE 0.9 %
1000 INTRAVENOUS SOLUTION INTRAVENOUS ONCE
Status: COMPLETED | OUTPATIENT
Start: 2023-12-21 | End: 2023-12-22

## 2023-12-21 RX ADMIN — SODIUM CHLORIDE 1000 ML: 9 INJECTION, SOLUTION INTRAVENOUS at 23:36

## 2023-12-21 RX ADMIN — ONDANSETRON 4 MG: 2 INJECTION INTRAMUSCULAR; INTRAVENOUS at 23:37

## 2023-12-22 VITALS
RESPIRATION RATE: 17 BRPM | DIASTOLIC BLOOD PRESSURE: 62 MMHG | SYSTOLIC BLOOD PRESSURE: 109 MMHG | OXYGEN SATURATION: 97 % | HEART RATE: 81 BPM | TEMPERATURE: 96.9 F

## 2023-12-22 RX ORDER — ONDANSETRON 4 MG/1
4 TABLET, ORALLY DISINTEGRATING ORAL EVERY 6 HOURS PRN
Qty: 10 TABLET | Refills: 1 | Status: SHIPPED | OUTPATIENT
Start: 2023-12-22

## 2023-12-22 NOTE — ED TRIAGE NOTES
Patient arrives via ems with apparent alcohol intoxication. Patient alert at times, combative at times and answering questions intermittently. EMS gluc 167. Resps even and unlabored on room air.

## 2023-12-22 NOTE — ED PROVIDER NOTES
CC/HPI: 64-year-old female to the emergency department with alcohol intoxication. EMS reports that patient was drinking heavily at a workplace Holiday party at a [employer]. Patient was on the second floor and was unable to walk down the stairs. Patient's coworker who accompanied the patient to the emergency department states that she does not believe the patient had enough to eat and was drinking heavily. She became intoxicated and then was unable to ambulate out of the restaurant. Police were called and patient was somewhat combative. There is no reports of trauma fall or injury. VITALS/PMH/PSH: Reviewed per nurses notes    REVIEW OF SYSTEMS: As in chief complaint history of present illness, otherwise all other systems are reviewed and negative the total 10 systems reviewed    PHYSICAL EXAM:  GEN: Pt intoxicated. Belligerent at times  HEENT:         Normocephalic/Atramatic        PERRL, sluggishly reactive to light       Throat non-edematous. No erythema noted. No exudates noted. Moist membranes  NECK: Nontender, no signs of trauma, no lymphadenopathy  HEART: Reg S1/S2, without murmer, rub or gallop  LUNGS: Clear to auscultation bilaterally, respirations even and unlabored  ABDOMEN: Bowel sounds positive, soft, nondistended. No apparent tenderness to palpation  MUSCULOSKELETAL/EXTREMITITES:  No signs of trauma, cyanosis or edema. LYMPH: no peripheral lympadenopathy noted  SKIN:  Warm & dry, no rash  NEUROLOGIC: Intoxicated, speech slurred. Moving all extremities. Medical decision making/ED course;  64-year-old female to the emergency department after becoming intoxicated at a [employer]. Patient taken to room 3. IV was established and lab work was obtained and reviewed. Patient CMP showed all parameters within normal limits other than glucose elevated at 168. ALT and AST were elevated at 50 and 36. Blood alcohol level was 377.   White blood cell count was 9.1 with an H&H of

## 2024-01-24 ENCOUNTER — OFFICE VISIT (OUTPATIENT)
Dept: FAMILY MEDICINE CLINIC | Age: 54
End: 2024-01-24
Payer: MEDICAID

## 2024-01-24 ENCOUNTER — HOSPITAL ENCOUNTER (OUTPATIENT)
Dept: GENERAL RADIOLOGY | Age: 54
Discharge: HOME | End: 2024-01-26
Payer: MEDICAID

## 2024-01-24 ENCOUNTER — HOSPITAL ENCOUNTER (OUTPATIENT)
Age: 54
Discharge: HOME | End: 2024-01-26
Payer: MEDICAID

## 2024-01-24 VITALS
OXYGEN SATURATION: 96 % | DIASTOLIC BLOOD PRESSURE: 84 MMHG | WEIGHT: 150 LBS | SYSTOLIC BLOOD PRESSURE: 124 MMHG | HEIGHT: 66 IN | HEART RATE: 86 BPM | BODY MASS INDEX: 24.11 KG/M2

## 2024-01-24 DIAGNOSIS — M79.672 LEFT FOOT PAIN: Primary | ICD-10-CM

## 2024-01-24 DIAGNOSIS — M79.672 LEFT FOOT PAIN: ICD-10-CM

## 2024-01-24 PROCEDURE — 73630 X-RAY EXAM OF FOOT: CPT

## 2024-01-24 PROCEDURE — 99213 OFFICE O/P EST LOW 20 MIN: CPT

## 2024-01-24 ASSESSMENT — PATIENT HEALTH QUESTIONNAIRE - PHQ9
SUM OF ALL RESPONSES TO PHQ9 QUESTIONS 1 & 2: 0
1. LITTLE INTEREST OR PLEASURE IN DOING THINGS: 0
SUM OF ALL RESPONSES TO PHQ QUESTIONS 1-9: 0
SUM OF ALL RESPONSES TO PHQ QUESTIONS 1-9: 0
2. FEELING DOWN, DEPRESSED OR HOPELESS: 0
SUM OF ALL RESPONSES TO PHQ QUESTIONS 1-9: 0
SUM OF ALL RESPONSES TO PHQ QUESTIONS 1-9: 0

## 2024-01-24 ASSESSMENT — ENCOUNTER SYMPTOMS: SHORTNESS OF BREATH: 0

## 2024-01-24 NOTE — PROGRESS NOTES
[facility] [place] [facility], [facility]  [address]  [address]  [place] [address]  Dept: [phone]  Dept Fax: [phone]  Loc: [phone]     2024    Alyson Chakraborty (:  1970) is a 53 y.o. female, Established patient, here for evaluation of the following chief complaint(s):  Foot Pain (Pt states she jammed her left foot about 1 week ago. Experiencing shooting pain on top of her foot.)      Vitals:    24 1346   BP: 124/84   Pulse: 86   SpO2: 96%       SUBJECTIVE/OBJECTIVE:    Patient presents with left foot pain x 1 week. States she initially hit her left forefoot on a gate in her home. She did have some pain in the bottom of the foot at that time. No noticeable bruising or swelling. Since this incident, pain has been increasing with sharp pains on the top of the foot. No pain at rest, worse when she is standing at work. She did take Ibuprofen and tried an ice pack with minimal relief.        Review of Systems   Constitutional:  Negative for chills and fever.   Respiratory:  Negative for shortness of breath.    Cardiovascular:  Negative for chest pain.   Musculoskeletal:  Positive for arthralgias.   Skin:  Negative for rash and wound.   Neurological:  Negative for headaches.       Physical Exam  Constitutional:       Appearance: Normal appearance.   HENT:      Head: Normocephalic and atraumatic.   Cardiovascular:      Pulses:           Dorsalis pedis pulses are 2+ on the left side.   Pulmonary:      Effort: Pulmonary effort is normal.   Musculoskeletal:      Left foot: Normal range of motion. No deformity.        Feet:    Feet:      Left foot:      Skin integrity: Skin integrity normal. No erythema.      Comments: Mild tenderness  Skin:     General: Skin is warm and dry.      Capillary Refill: Capillary refill takes less than 2 seconds.   Neurological:      General: No focal deficit present.      Mental Status: She is alert and oriented

## 2024-02-29 ENCOUNTER — TELEMEDICINE (OUTPATIENT)
Dept: FAMILY MEDICINE CLINIC | Age: 54
End: 2024-02-29
Payer: MEDICAID

## 2024-02-29 DIAGNOSIS — J01.10 ACUTE NON-RECURRENT FRONTAL SINUSITIS: Primary | ICD-10-CM

## 2024-02-29 PROCEDURE — 99213 OFFICE O/P EST LOW 20 MIN: CPT

## 2024-02-29 RX ORDER — AMOXICILLIN AND CLAVULANATE POTASSIUM 875; 125 MG/1; MG/1
1 TABLET, FILM COATED ORAL 2 TIMES DAILY
Qty: 20 TABLET | Refills: 0 | Status: SHIPPED | OUTPATIENT
Start: 2024-02-29 | End: 2024-03-10

## 2024-02-29 ASSESSMENT — ENCOUNTER SYMPTOMS
EYES NEGATIVE: 1
SINUS PRESSURE: 1
WHEEZING: 0
COUGH: 0
SORE THROAT: 0
SINUS PAIN: 1
ABDOMINAL PAIN: 0
RHINORRHEA: 0

## 2024-02-29 NOTE — PROGRESS NOTES
[facility]                Patient was contacted and agreed to proceed with a virtual visit via [employer]  Alyson Chakraborty, was evaluated through a synchronous (real-time) audio-video  encounter. The patient (or guardian if applicable) is aware that this is a billable  service, which includes applicable co-pays. This Virtual Visit was conducted with  patient's (and/or legal guardian's) consent. Patient identification was verified,  and a caregiver was present when appropriate. The patient was located in a  state where the provider was licensed to provide care.      HPI:    Alyson Chakraborty (:  1970) has requested an audio/video evaluation for the following concern(s):    Diagnosed with COVID-19 5 days ago, symptom onset 7 days ago.  She has had minimal coughing.  Sinus congestion symptoms are worsening.  She is having a pressure and tingling in both ears across her forehead and face.  She has a large amount of sinus drainage.          ASSESSMENT/PLAN       LMP     1. Acute non-recurrent frontal sinusitis  -     amoxicillin-clavulanate (AUGMENTIN) 875-125 MG per tablet; Take 1 tablet by mouth 2 times daily for 10 days, Disp-20 tablet, R-0Normal          PATIENT REFERRED TO:  Return for PCP.    DISCHARGE MEDICATIONS:  New Prescriptions    AMOXICILLIN-CLAVULANATE (AUGMENTIN) 875-125 MG PER TABLET    Take 1 tablet by mouth 2 times daily for 10 days     Cannot display discharge medications since this is not an admission.       Rony Gil, APRN - CNP    CHIEF COMPLAINT       Chief Complaint   Patient presents with    Positive For Covid-19     Tested positive for COVID on 24. Still having fevers and now feels like she has a sinus inf.          SUBJECTIVE/REVIEW OF SYSTEMS     Review of Systems   Constitutional:  Positive for fatigue. Negative for chills and fever.   HENT:  Positive for congestion, ear pain, sinus pressure and sinus pain. Negative for rhinorrhea and sore throat.

## 2024-03-15 ENCOUNTER — TELEPHONE (OUTPATIENT)
Dept: FAMILY MEDICINE CLINIC | Age: 54
End: 2024-03-15

## 2024-03-15 DIAGNOSIS — H60.543 ECZEMA OF BOTH EXTERNAL EARS: Primary | ICD-10-CM

## 2024-03-15 NOTE — TELEPHONE ENCOUNTER
----- Message from Kamila Choe sent at 3/15/2024  8:33 AM EDT -----  Subject: Referral Request    Reason for referral request? Dermatology  Provider patient wants to be referred to(if known):     Provider Phone Number(if known):    Additional Information for Provider? Patient was referred to dermatologist   but needs a provider name. Please call ASAP  ---------------------------------------------------------------------------  --------------  CALL BACK INFO    [phone]; OK to leave message on voicemail  ---------------------------------------------------------------------------  --------------

## 2024-04-11 ENCOUNTER — OFFICE VISIT (OUTPATIENT)
Dept: FAMILY MEDICINE CLINIC | Age: 54
End: 2024-04-11
Payer: MEDICAID

## 2024-04-11 VITALS
DIASTOLIC BLOOD PRESSURE: 80 MMHG | WEIGHT: 158 LBS | HEART RATE: 80 BPM | OXYGEN SATURATION: 95 % | BODY MASS INDEX: 25.39 KG/M2 | TEMPERATURE: 98.6 F | SYSTOLIC BLOOD PRESSURE: 140 MMHG | HEIGHT: 66 IN

## 2024-04-11 DIAGNOSIS — R73.09 ELEVATED GLUCOSE: ICD-10-CM

## 2024-04-11 DIAGNOSIS — E11.65 TYPE 2 DIABETES MELLITUS WITH HYPERGLYCEMIA, WITHOUT LONG-TERM CURRENT USE OF INSULIN (HCC): Primary | ICD-10-CM

## 2024-04-11 DIAGNOSIS — R53.83 OTHER FATIGUE: ICD-10-CM

## 2024-04-11 LAB — HBA1C MFR BLD: 6.6 %

## 2024-04-11 PROCEDURE — 3044F HG A1C LEVEL LT 7.0%: CPT

## 2024-04-11 PROCEDURE — 83036 HEMOGLOBIN GLYCOSYLATED A1C: CPT

## 2024-04-11 PROCEDURE — 99214 OFFICE O/P EST MOD 30 MIN: CPT

## 2024-04-11 SDOH — ECONOMIC STABILITY: TRANSPORTATION INSECURITY
IN THE PAST 12 MONTHS, HAS LACK OF TRANSPORTATION KEPT YOU FROM MEETINGS, WORK, OR FROM GETTING THINGS NEEDED FOR DAILY LIVING?: PATIENT DECLINED

## 2024-04-11 SDOH — ECONOMIC STABILITY: HOUSING INSECURITY

## 2024-04-11 SDOH — ECONOMIC STABILITY: FOOD INSECURITY: WITHIN THE PAST 12 MONTHS, YOU WORRIED THAT YOUR FOOD WOULD RUN OUT BEFORE YOU GOT MONEY TO BUY MORE.: PATIENT DECLINED

## 2024-04-11 SDOH — ECONOMIC STABILITY: FOOD INSECURITY

## 2024-04-11 SDOH — ECONOMIC STABILITY: INCOME INSECURITY

## 2024-04-11 SDOH — ECONOMIC STABILITY: FOOD INSECURITY: WITHIN THE PAST 12 MONTHS, THE FOOD YOU BOUGHT JUST DIDN'T LAST AND YOU DIDN'T HAVE MONEY TO GET MORE.: PATIENT DECLINED

## 2024-04-11 SDOH — ECONOMIC STABILITY: HOUSING INSECURITY
IN THE LAST 12 MONTHS, WAS THERE A TIME WHEN YOU DID NOT HAVE A STEADY PLACE TO SLEEP OR SLEPT IN A SHELTER (INCLUDING NOW)?: PATIENT DECLINED

## 2024-04-11 SDOH — ECONOMIC STABILITY: INCOME INSECURITY: HOW HARD IS IT FOR YOU TO PAY FOR THE VERY BASICS LIKE FOOD, HOUSING, MEDICAL CARE, AND HEATING?: PATIENT DECLINED

## 2024-04-11 NOTE — PROGRESS NOTES
[facility]          ASSESSMENT/PLAN     Alyson Chakraborty is a 53 y.o. female who presents with:  Chief Complaint   Patient presents with    Follow-up     Pt has not been diagnosed with DM but she has been feeling bad and has been checking her sugar and its been over 200 consistently.      Blood glucose levels elevated at home, 144.  She checked her blood sugar because she was having symptoms of fatigue for several weeks.  History of gestational diabetes.  Alcohol use 2 glasses of wine daily.  Half a pack a day smoker for over 20 years.        1. Type 2 diabetes mellitus with hyperglycemia, without long-term current use of insulin (HCC)  -     Comprehensive Metabolic Panel; Future  -     CBC with Auto Differential; Future  -     Lipid Panel; Future  -     Microalbumin / Creatinine Urine Ratio; Future  -     metFORMIN (GLUCOPHAGE) 500 MG tablet; Take 1 tablet by mouth 2 times daily (with meals), Disp-180 tablet, R-1Normal  -     [facility]  2. Elevated glucose  -     POCT glycosylated hemoglobin (Hb A1C)  3. Other fatigue  -     T4, Free; Future  -     TSH; Future        PATIENT EDUCATION:    Patient education packet on carb counting with diabetes, meal planning with diabetes.         PATIENT REFERRED TO:    Return in about 1 week (around 4/18/2024) for PCP.    DISCHARGE MEDICATIONS:  New Prescriptions    METFORMIN (GLUCOPHAGE) 500 MG TABLET    Take 1 tablet by mouth 2 times daily (with meals)     Cannot display discharge medications since this is not an admission.       OANH Yang - CNP      SUBJECTIVE/REVIEW OF SYSTEMS     Review of Systems   Constitutional:  Positive for fatigue. Negative for fever.   HENT: Negative.  Negative for congestion.    Eyes: Negative.    Respiratory: Negative.  Negative for cough and shortness of breath.    Cardiovascular: Negative.    Gastrointestinal: Negative.  Negative for abdominal pain.   Endocrine: Negative.    Genitourinary:

## 2024-04-12 ENCOUNTER — HOSPITAL ENCOUNTER (OUTPATIENT)
Dept: LAB | Age: 54
Discharge: HOME | End: 2024-04-12
Payer: MEDICAID

## 2024-04-12 DIAGNOSIS — R53.83 OTHER FATIGUE: ICD-10-CM

## 2024-04-12 DIAGNOSIS — E11.65 TYPE 2 DIABETES MELLITUS WITH HYPERGLYCEMIA, WITHOUT LONG-TERM CURRENT USE OF INSULIN (HCC): ICD-10-CM

## 2024-04-12 LAB
ALBUMIN SERPL-MCNC: 4.8 G/DL (ref 3.5–4.6)
ALP SERPL-CCNC: 111 U/L (ref 40–130)
ALT SERPL-CCNC: 45 U/L (ref 0–33)
ANION GAP SERPL CALCULATED.3IONS-SCNC: 13 MEQ/L (ref 9–15)
AST SERPL-CCNC: 27 U/L (ref 0–35)
BASOPHILS # BLD: 0.1 K/UL (ref 0–0.2)
BASOPHILS NFR BLD: 1.1 %
BILIRUB SERPL-MCNC: 0.4 MG/DL (ref 0.2–0.7)
BUN SERPL-MCNC: 16 MG/DL (ref 6–20)
CALCIUM SERPL-MCNC: 9.6 MG/DL (ref 8.5–9.9)
CHLORIDE SERPL-SCNC: 102 MEQ/L (ref 95–107)
CHOLEST SERPL-MCNC: 241 MG/DL (ref 0–199)
CO2 SERPL-SCNC: 24 MEQ/L (ref 20–31)
CREAT SERPL-MCNC: 0.59 MG/DL (ref 0.5–0.9)
CREAT UR-MCNC: 32 MG/DL
EOSINOPHIL # BLD: 0.2 K/UL (ref 0–0.7)
EOSINOPHIL NFR BLD: 2.4 %
ERYTHROCYTE [DISTWIDTH] IN BLOOD BY AUTOMATED COUNT: 12.5 % (ref 11.5–14.5)
GLOBULIN SER CALC-MCNC: 2.6 G/DL (ref 2.3–3.5)
GLUCOSE SERPL-MCNC: 143 MG/DL (ref 70–99)
HCT VFR BLD AUTO: 43.6 % (ref 37–47)
HDLC SERPL-MCNC: 48 MG/DL (ref 40–59)
HGB BLD-MCNC: 14.8 G/DL (ref 12–16)
LDLC SERPL CALC-MCNC: 161 MG/DL (ref 0–129)
LYMPHOCYTES # BLD: 2 K/UL (ref 1–4.8)
LYMPHOCYTES NFR BLD: 30.3 %
MCH RBC QN AUTO: 31.4 PG (ref 27–31.3)
MCHC RBC AUTO-ENTMCNC: 33.9 % (ref 33–37)
MCV RBC AUTO: 92.4 FL (ref 79.4–94.8)
MICROALBUMIN UR-MCNC: <1.2 MG/DL
MICROALBUMIN/CREAT UR-RTO: NORMAL MG/G (ref 0–30)
MONOCYTES # BLD: 0.7 K/UL (ref 0.2–0.8)
MONOCYTES NFR BLD: 10.8 %
NEUTROPHILS # BLD: 3.6 K/UL (ref 1.4–6.5)
NEUTS SEG NFR BLD: 55.1 %
PLATELET # BLD AUTO: 217 K/UL (ref 130–400)
POTASSIUM SERPL-SCNC: 4.3 MEQ/L (ref 3.4–4.9)
PROT SERPL-MCNC: 7.4 G/DL (ref 6.3–8)
RBC # BLD AUTO: 4.72 M/UL (ref 4.2–5.4)
SODIUM SERPL-SCNC: 139 MEQ/L (ref 135–144)
T4 FREE SERPL-MCNC: 1.23 NG/DL (ref 0.84–1.68)
TRIGL SERPL-MCNC: 159 MG/DL (ref 0–150)
TSH SERPL-MCNC: 1.68 UIU/ML (ref 0.44–3.86)
WBC # BLD AUTO: 6.6 K/UL (ref 4.8–10.8)

## 2024-04-12 PROCEDURE — 36415 COLL VENOUS BLD VENIPUNCTURE: CPT

## 2024-04-12 PROCEDURE — 82043 UR ALBUMIN QUANTITATIVE: CPT

## 2024-04-12 PROCEDURE — 85025 COMPLETE CBC W/AUTO DIFF WBC: CPT

## 2024-04-12 PROCEDURE — 84439 ASSAY OF FREE THYROXINE: CPT

## 2024-04-12 PROCEDURE — 82570 ASSAY OF URINE CREATININE: CPT

## 2024-04-12 PROCEDURE — 80053 COMPREHEN METABOLIC PANEL: CPT

## 2024-04-12 PROCEDURE — 84443 ASSAY THYROID STIM HORMONE: CPT

## 2024-04-12 PROCEDURE — 80061 LIPID PANEL: CPT

## 2024-04-12 ASSESSMENT — ENCOUNTER SYMPTOMS
SHORTNESS OF BREATH: 0
RESPIRATORY NEGATIVE: 1
COUGH: 0
GASTROINTESTINAL NEGATIVE: 1
ALLERGIC/IMMUNOLOGIC NEGATIVE: 1
EYES NEGATIVE: 1
ABDOMINAL PAIN: 0

## 2024-04-18 ENCOUNTER — OFFICE VISIT (OUTPATIENT)
Dept: FAMILY MEDICINE CLINIC | Age: 54
End: 2024-04-18
Payer: MEDICAID

## 2024-04-18 VITALS
BODY MASS INDEX: 24.6 KG/M2 | SYSTOLIC BLOOD PRESSURE: 130 MMHG | WEIGHT: 152.4 LBS | HEART RATE: 84 BPM | OXYGEN SATURATION: 98 % | DIASTOLIC BLOOD PRESSURE: 70 MMHG

## 2024-04-18 DIAGNOSIS — E11.65 TYPE 2 DIABETES MELLITUS WITH HYPERGLYCEMIA, WITHOUT LONG-TERM CURRENT USE OF INSULIN (HCC): Primary | ICD-10-CM

## 2024-04-18 DIAGNOSIS — E78.2 MIXED HYPERLIPIDEMIA: ICD-10-CM

## 2024-04-18 PROCEDURE — 99214 OFFICE O/P EST MOD 30 MIN: CPT

## 2024-04-18 PROCEDURE — 3044F HG A1C LEVEL LT 7.0%: CPT

## 2024-04-18 ASSESSMENT — ENCOUNTER SYMPTOMS
ALLERGIC/IMMUNOLOGIC NEGATIVE: 1
EYES NEGATIVE: 1
COUGH: 0
GASTROINTESTINAL NEGATIVE: 1
SHORTNESS OF BREATH: 0
RESPIRATORY NEGATIVE: 1

## 2024-04-18 NOTE — PROGRESS NOTES
sounds: Normal heart sounds, S1 normal and S2 normal.   Pulmonary:      Effort: Pulmonary effort is normal.      Breath sounds: Normal breath sounds and air entry. No decreased breath sounds or wheezing.   Musculoskeletal:      Cervical back: Normal range of motion and neck supple.      Right lower leg: No edema.      Left lower leg: No edema.   Lymphadenopathy:      Cervical:      Right cervical: No superficial or posterior cervical adenopathy.     Left cervical: No superficial or posterior cervical adenopathy.      Upper Body:      Right upper body: No supraclavicular adenopathy.      Left upper body: No supraclavicular adenopathy.   Skin:     Capillary Refill: Capillary refill takes less than 2 seconds.   Neurological:      Mental Status: She is alert and oriented to person, place, and time.   Psychiatric:         Mood and Affect: Mood normal.         Behavior: Behavior normal.                       An electronic signature was used to authenticate this note.     Rony Gil, APRN - CNP

## 2024-04-18 NOTE — PATIENT INSTRUCTIONS
Read the labels of the foods you buy from the market to find out how much fat is present. Under 5% of total fat on a label means it is \"low fat\". Over 20% of total fat on a label means it is \"high fat\".  Of total calories that you eat you should only be obtaining 10% or less of these calories from saturated fats.    Eat high fiber foods including green leafy vegetables, fruits, legum's and  whole grain breads. .This type of fiber helps lower cholesterol in the body. Choose oatmeal, fruits (like apples), beans, and nuts to get the most soluble fiber.    Stay away from butter, stick margarine, shortening, lard, palm and coconut oil.  Choose plant-based spreads instead.    Do not eat high-fat processed foods like hot dogs, downing, sausage, ham and other luncheon meats high in fat, and some frozen foods.  Routinely choose fish, chicken, turkey, and lean meats instead.    As a rule of thumb stay away from deep fried foods.

## 2024-04-29 ENCOUNTER — OFFICE VISIT (OUTPATIENT)
Dept: FAMILY MEDICINE CLINIC | Age: 54
End: 2024-04-29
Payer: MEDICAID

## 2024-04-29 VITALS — HEIGHT: 66 IN | TEMPERATURE: 97.8 F | WEIGHT: 152 LBS | BODY MASS INDEX: 24.43 KG/M2

## 2024-04-29 DIAGNOSIS — T14.8XXA NON-HEALING NON-SURGICAL WOUND: Primary | ICD-10-CM

## 2024-04-29 DIAGNOSIS — L30.9 ECZEMA, UNSPECIFIED TYPE: ICD-10-CM

## 2024-04-29 PROCEDURE — 99214 OFFICE O/P EST MOD 30 MIN: CPT | Performed by: FAMILY MEDICINE

## 2024-04-29 PROCEDURE — 11104 PUNCH BX SKIN SINGLE LESION: CPT | Performed by: FAMILY MEDICINE

## 2024-04-29 ASSESSMENT — ENCOUNTER SYMPTOMS: COLOR CHANGE: 1

## 2024-04-29 NOTE — PATIENT INSTRUCTIONS
Patient will continue the cream provided by her prior provider to the ears.  Partial resolution is noted.  Explained to patient she not currently needs to give it more time.    Culture and biopsy taken of scalp lesion today.    Follow-up appointment 2 weeks to review results.    Incision/Laceration repair    -Clean surgical area with antibacterial soap and water once daily.      --You may be instructed to soak the wound with Hydrogen Peroxide to loosen scabbing around sutures, this is not to be done more often that every 3 days, should be for 30 seconds-1 min and then rinsed off with water.     -Keep surgical site moist with vaseline or antibiotic ointment (single- Bacitracin, not triple antibiotic or Neosporin) and apply a fresh bandage daily until a solid scab forms or if the wound is at risk for trauma or dirt. It is important to leave the wound uncovered part of the day to allow the skin to dry and avoid breakdown from excessive moisture. There may be exceptions to this, the staff will provide information if your wound requires a variation in this, that will often involve a prescription ointment or cream.     -Follow up immediately if any growing redness (minimal redness or pale pink is normal along wound edges) surrounds the surgical site or if dripping drainage occurs at surgical site. Once a solid scab forms no more bandage needed. A wet scab can look yellow.  This is not infection, just moisture.    -Once the lesion is healed be sure to apply sunscreen to the area to prevent burn of the newer and more delicate skin especially during the first 6 months of healing.        -If the scar begins to be raised you may massage the area firmly twice a day to help break down scar tissue and help the area become a flat scar. There is some evidence that Mederma applied to a scar daily for the first few months can help shrink and fade it more quickly then without intervention.

## 2024-04-29 NOTE — PROGRESS NOTES
normal. No nasal deformity.   Eyes:      General: Lids are normal.         Right eye: No discharge.         Left eye: No discharge.      Conjunctiva/sclera: Conjunctivae normal.      Pupils: Pupils are equal, round, and reactive to light.   Neck:      Thyroid: No thyroid mass or thyromegaly.      Vascular: No JVD.      Trachea: Trachea and phonation normal.   Cardiovascular:      Rate and Rhythm: Normal rate and regular rhythm.   Pulmonary:      Effort: No accessory muscle usage or respiratory distress.   Musculoskeletal:      Cervical back: Full passive range of motion without pain.      Comments: FROM all large muscle groups and joints as witnessed when walking to exam table, getting on, and getting off the exam table.    Skin:     General: Skin is warm and dry.      Findings: No rash.      Comments: Biateral celia of the ear minimal flaking noted at this time without erythema.    Posterior scalp occipital region superiorly to the left there is a pink plaque with minimal flaking, no exudate, but evidence of excoriation noted   Neurological:      Mental Status: She is alert.      Motor: No tremor or atrophy.      Gait: Gait normal.   Psychiatric:         Speech: Speech normal.         Behavior: Behavior normal.         Thought Content: Thought content normal.     Lesion biopsied        Eczema lesions          CONSENT: scalp  The procedure was discussed with the patient.  All questions were answered and alternative options discussed.  The patient is aware of the risks of bleeding, infection,unsatisfactory scar result.  Informed consent paperwork was signed by the patient.    PROCEDURE:  Biopsy done using 1 cc of 1% lidocaine with epinephrine for anesthesia.  A 4 mm punch biopsy was used to obtain the biopsy.  Drysol was used to control blood loss at the base of site.  EBL < 1cc.    ASSESSMENT:   Diagnosis Orders   1. Non-healing non-surgical wound  Culture, Wound    MT PUNCH BIOPSY SKIN SINGLE LESION    Specimen to

## 2024-04-30 DIAGNOSIS — T14.8XXA NON-HEALING NON-SURGICAL WOUND: ICD-10-CM

## 2024-05-02 DIAGNOSIS — A49.01 MSSA (METHICILLIN SUSCEPTIBLE STAPHYLOCOCCUS AUREUS) INFECTION: Primary | ICD-10-CM

## 2024-05-02 RX ORDER — CEPHALEXIN 500 MG/1
500 CAPSULE ORAL 3 TIMES DAILY
Qty: 30 CAPSULE | Refills: 0 | Status: SHIPPED | OUTPATIENT
Start: 2024-05-02 | End: 2024-05-12

## 2024-05-02 NOTE — RESULT ENCOUNTER NOTE
Notify patient culture is demonstrating infection with Staph aureus.  This is not MRSA.    Antibiotic has been sent to the pharmacy.  Pathology is pending

## 2024-05-03 LAB
BACTERIA SPEC ANAEROBE+AEROBE CULT: ABNORMAL
BACTERIA SPEC ANAEROBE+AEROBE CULT: ABNORMAL
ORGANISM: ABNORMAL

## 2024-05-06 DIAGNOSIS — B95.62 INFECTION OF SKIN DUE TO METHICILLIN RESISTANT STAPHYLOCOCCUS AUREUS (MRSA): Primary | ICD-10-CM

## 2024-05-06 DIAGNOSIS — L08.9 INFECTION OF SKIN DUE TO METHICILLIN RESISTANT STAPHYLOCOCCUS AUREUS (MRSA): Primary | ICD-10-CM

## 2024-05-06 RX ORDER — SULFAMETHOXAZOLE AND TRIMETHOPRIM 800; 160 MG/1; MG/1
1 TABLET ORAL 2 TIMES DAILY
Qty: 20 TABLET | Refills: 0 | Status: SHIPPED | OUTPATIENT
Start: 2024-05-06 | End: 2024-05-16

## 2024-05-06 NOTE — RESULT ENCOUNTER NOTE
Yes, unfortunately it is much more specific and should help prevent any rebound infection when she stops the other antibiotic as it is not as specific for the infection

## 2024-05-06 NOTE — RESULT ENCOUNTER NOTE
When the final testing came back with sensitivity it demonstrated this IS actually MRSA.    I am ordering a different antibiotic.  I will send in trimethoprim/sulfamethoxazole for the patient.  She does not need to take the cephalexin.  If she started it she can stop it when she picks up the trimethoprim

## 2024-05-13 ENCOUNTER — OFFICE VISIT (OUTPATIENT)
Dept: FAMILY MEDICINE CLINIC | Age: 54
End: 2024-05-13
Payer: MEDICAID

## 2024-05-13 VITALS — BODY MASS INDEX: 24.43 KG/M2 | TEMPERATURE: 98 F | HEIGHT: 66 IN | WEIGHT: 152 LBS

## 2024-05-13 DIAGNOSIS — L08.9 INFECTION OF SKIN DUE TO METHICILLIN RESISTANT STAPHYLOCOCCUS AUREUS (MRSA): ICD-10-CM

## 2024-05-13 DIAGNOSIS — T14.8XXA NON-HEALING NON-SURGICAL WOUND: Primary | ICD-10-CM

## 2024-05-13 DIAGNOSIS — B95.62 INFECTION OF SKIN DUE TO METHICILLIN RESISTANT STAPHYLOCOCCUS AUREUS (MRSA): ICD-10-CM

## 2024-05-13 PROCEDURE — 99212 OFFICE O/P EST SF 10 MIN: CPT | Performed by: FAMILY MEDICINE

## 2024-05-13 NOTE — PROGRESS NOTES
T4 Free 1.23 0.84 - 1.68 ng/dL   Comprehensive Metabolic Panel    Collection Time: 04/12/24  9:04 AM   Result Value Ref Range    Sodium 139 135 - 144 mEq/L    Potassium 4.3 3.4 - 4.9 mEq/L    Chloride 102 95 - 107 mEq/L    CO2 24 20 - 31 mEq/L    Anion Gap 13 9 - 15 mEq/L    Glucose 143 (H) 70 - 99 mg/dL    BUN 16 6 - 20 mg/dL    Creatinine 0.59 0.50 - 0.90 mg/dL    Est, Glom Filt Rate >90.0 >60    Calcium 9.6 8.5 - 9.9 mg/dL    Total Protein 7.4 6.3 - 8.0 g/dL    Albumin 4.8 (H) 3.5 - 4.6 g/dL    Total Bilirubin 0.4 0.2 - 0.7 mg/dL    Alkaline Phosphatase 111 40 - 130 U/L    ALT 45 (H) 0 - 33 U/L    AST 27 0 - 35 U/L    Globulin 2.6 2.3 - 3.5 g/dL   CBC with Auto Differential    Collection Time: 04/12/24  9:04 AM   Result Value Ref Range    WBC 6.6 4.8 - 10.8 K/uL    RBC 4.72 4.20 - 5.40 M/uL    Hemoglobin 14.8 12.0 - 16.0 g/dL    Hematocrit 43.6 37.0 - 47.0 %    MCV 92.4 79.4 - 94.8 fL    MCH 31.4 (H) 27.0 - 31.3 pg    MCHC 33.9 33.0 - 37.0 %    RDW 12.5 11.5 - 14.5 %    Platelets 217 130 - 400 K/uL    Neutrophils % 55.1 %    Lymphocytes % 30.3 %    Monocytes % 10.8 %    Eosinophils % 2.4 %    Basophils % 1.1 %    Neutrophils Absolute 3.6 1.4 - 6.5 K/uL    Lymphocytes Absolute 2.0 1.0 - 4.8 K/uL    Monocytes Absolute 0.7 0.2 - 0.8 K/uL    Eosinophils Absolute 0.2 0.0 - 0.7 K/uL    Basophils Absolute 0.1 0.0 - 0.2 K/uL   Lipid Panel    Collection Time: 04/12/24  9:04 AM   Result Value Ref Range    Cholesterol, Total 241 (H) 0 - 199 mg/dL    Triglycerides 159 (H) 0 - 150 mg/dL    HDL 48 40 - 59 mg/dL    LDL Calculated 161 (H) 0 - 129 mg/dL   TSH    Collection Time: 04/12/24  9:04 AM   Result Value Ref Range    TSH 1.680 0.440 - 3.860 uIU/mL   Microalbumin / Creatinine Urine Ratio    Collection Time: 04/12/24  9:04 AM   Result Value Ref Range    Microalbumin, Random Urine <1.20 Not Established mg/dL    Creatinine, Ur 32.0 Not Established mg/dL    Microalbumin Creatinine Ratio see below 0.0 - 30.0 mg/G   Culture,

## 2024-05-13 NOTE — PATIENT INSTRUCTIONS
Patient will take sulfa antibiotic for MRSA positive skin culture.    Still waiting on pathology.    Will return in the fall for skin exam for skin cancer screening and potential treatment

## 2024-05-22 PROBLEM — L30.8 SPONGIOTIC DERMATITIS: Status: ACTIVE | Noted: 2024-05-22

## 2024-06-04 ENCOUNTER — PATIENT MESSAGE (OUTPATIENT)
Dept: FAMILY MEDICINE CLINIC | Age: 54
End: 2024-06-04

## 2024-06-17 ENCOUNTER — TELEMEDICINE (OUTPATIENT)
Dept: FAMILY MEDICINE CLINIC | Age: 54
End: 2024-06-17
Payer: MEDICAID

## 2024-06-17 DIAGNOSIS — L98.9 ABNORMAL SKIN MORPHOLOGY DETERMINED BY BIOPSY: Primary | ICD-10-CM

## 2024-06-17 PROCEDURE — 99213 OFFICE O/P EST LOW 20 MIN: CPT | Performed by: FAMILY MEDICINE

## 2024-06-17 NOTE — PROGRESS NOTES
Diagnosis Orders   1. Abnormal skin morphology determined by biopsy  Johana Edge MD, Dermatology, [place]            Orders Placed This Encounter   Procedures    Johana Edge MD, Dermatology, [place]     Referral Priority:   Routine     Referral Type:   Eval and Treat     Referral Reason:   Specialty Services Required     Referred to Provider:   Johana Hernandez DO     Requested Specialty:   Dermatology     Number of Visits Requested:   1       No follow-ups on file.    There are no Patient Instructions on file for this visit.    This visit began at 3:15 pm    The location for this appointment was the [facility] primary care site.    TELEHEALTH APPOINTMENT  Patient has been screened to determine that this visit qualifies for a \"Video Visit\".   This visit was via video due to the restrictions of the COVID-19 pandemic.  All issues as below were discussed and addressed but note a limited visually based physical exam was performed.  If it was felt the patient should be evaluated in the clinic there will be comment below demonstrating they were directed there.    The patient is aware and has given verbal consent to be billed for this video encounter.    The resources used for this visit were [employer] for chart access and Epic [facility]    Chief Complaint   Patient presents with    Results     Pathology        HPI        PMH:    Current Outpatient Medications on File Prior to Visit   Medication Sig Dispense Refill    fluticasone (FLONASE) 50 MCG/ACT nasal spray 2 sprays by Nasal route daily 16 g 3     No current facility-administered medications on file prior to visit.     Past Medical History:   Diagnosis Date    Allergic rhinitis 2/14/2019    Anxiety     ETOH abuse 11/15/2018    Gestational diabetes     With last child    Mixed hyperlipidemia 1/30/2023     Past Surgical History:   Procedure Laterality Date    HYSTERECTOMY (CERVIX STATUS UNKNOWN)      HYSTERECTOMY, VAGINAL       Social History

## 2024-07-31 ENCOUNTER — OFFICE VISIT (OUTPATIENT)
Dept: FAMILY MEDICINE CLINIC | Age: 54
End: 2024-07-31
Payer: MEDICAID

## 2024-07-31 VITALS
SYSTOLIC BLOOD PRESSURE: 120 MMHG | HEIGHT: 66 IN | DIASTOLIC BLOOD PRESSURE: 82 MMHG | WEIGHT: 141 LBS | HEART RATE: 77 BPM | OXYGEN SATURATION: 97 % | TEMPERATURE: 97.2 F | BODY MASS INDEX: 22.66 KG/M2

## 2024-07-31 DIAGNOSIS — E78.2 MIXED HYPERLIPIDEMIA: ICD-10-CM

## 2024-07-31 DIAGNOSIS — Z13.29 SCREENING FOR THYROID DISORDER: ICD-10-CM

## 2024-07-31 DIAGNOSIS — J06.9 UPPER RESPIRATORY TRACT INFECTION, UNSPECIFIED TYPE: Primary | ICD-10-CM

## 2024-07-31 DIAGNOSIS — Z12.11 COLON CANCER SCREENING: ICD-10-CM

## 2024-07-31 DIAGNOSIS — E11.65 TYPE 2 DIABETES MELLITUS WITH HYPERGLYCEMIA, WITHOUT LONG-TERM CURRENT USE OF INSULIN (HCC): ICD-10-CM

## 2024-07-31 DIAGNOSIS — Z13.0 SCREENING FOR DEFICIENCY ANEMIA: ICD-10-CM

## 2024-07-31 DIAGNOSIS — F17.200 CURRENT SMOKER: ICD-10-CM

## 2024-07-31 PROBLEM — R05.1 ACUTE COUGH: Status: RESOLVED | Noted: 2024-07-31 | Resolved: 2024-07-31

## 2024-07-31 PROBLEM — F41.0 PANIC ATTACKS: Status: RESOLVED | Noted: 2018-03-08 | Resolved: 2024-07-31

## 2024-07-31 PROBLEM — R05.1 ACUTE COUGH: Status: ACTIVE | Noted: 2024-07-31

## 2024-07-31 PROBLEM — F41.9 ANXIETY: Status: RESOLVED | Noted: 2018-03-08 | Resolved: 2024-07-31

## 2024-07-31 PROBLEM — B95.62 INFECTION OF SKIN DUE TO METHICILLIN RESISTANT STAPHYLOCOCCUS AUREUS (MRSA): Status: RESOLVED | Noted: 2024-05-06 | Resolved: 2024-07-31

## 2024-07-31 PROBLEM — L08.9 INFECTION OF SKIN DUE TO METHICILLIN RESISTANT STAPHYLOCOCCUS AUREUS (MRSA): Status: RESOLVED | Noted: 2024-05-06 | Resolved: 2024-07-31

## 2024-07-31 PROBLEM — J30.9 ALLERGIC RHINITIS: Status: RESOLVED | Noted: 2019-02-14 | Resolved: 2024-07-31

## 2024-07-31 LAB
Lab: NORMAL
PERFORMING INSTRUMENT: NORMAL
QC PASS/FAIL: NORMAL
SARS-COV-2, POC: NORMAL

## 2024-07-31 PROCEDURE — 87426 SARSCOV CORONAVIRUS AG IA: CPT | Performed by: INTERNAL MEDICINE

## 2024-07-31 PROCEDURE — 99214 OFFICE O/P EST MOD 30 MIN: CPT | Performed by: INTERNAL MEDICINE

## 2024-07-31 PROCEDURE — 3044F HG A1C LEVEL LT 7.0%: CPT | Performed by: INTERNAL MEDICINE

## 2024-07-31 RX ORDER — AZITHROMYCIN 500 MG/1
500 TABLET, FILM COATED ORAL DAILY
Qty: 7 TABLET | Refills: 0 | Status: SHIPPED | OUTPATIENT
Start: 2024-07-31 | End: 2024-08-07

## 2024-07-31 RX ORDER — FLUTICASONE PROPIONATE 50 MCG
2 SPRAY, SUSPENSION (ML) NASAL DAILY
Qty: 16 G | Refills: 0 | Status: SHIPPED | OUTPATIENT
Start: 2024-07-31

## 2024-07-31 SDOH — HEALTH STABILITY: PHYSICAL HEALTH: ON AVERAGE, HOW MANY DAYS PER WEEK DO YOU ENGAGE IN MODERATE TO STRENUOUS EXERCISE (LIKE A BRISK WALK)?: 3 DAYS

## 2024-07-31 SDOH — HEALTH STABILITY: PHYSICAL HEALTH: ON AVERAGE, HOW MANY MINUTES DO YOU ENGAGE IN EXERCISE AT THIS LEVEL?: 30 MIN

## 2024-07-31 ASSESSMENT — ENCOUNTER SYMPTOMS
COUGH: 1
SINUS PAIN: 0
CONSTIPATION: 0
ABDOMINAL PAIN: 0
NAUSEA: 0
CHEST TIGHTNESS: 0
DIARRHEA: 0
VOICE CHANGE: 0
BLOOD IN STOOL: 0
SHORTNESS OF BREATH: 0
WHEEZING: 0

## 2024-07-31 NOTE — PROGRESS NOTES
[facility] PRIMARY CARE  [address]  [address]  [facility] [place] [facility] [address]  Dept: [phone]  Dept Fax: [phone]  Loc: [phone]     7/31/2024    Visit type: Acute care    Reason for Visit: Establish Care (Pt presents today to est. Care, previous PCP no longer. ), Sinus Problem (Pt states congestion, sinus pressure, cough, headache, nausea, lack of appetite x 3/4 days. Has taken dayquil and nyquil for sx's. ), and Health Maintenance (Previous cologuard order discontinued, pt agreeable to complete, would like it reordered. )       ASSESSMENT/PLAN   1. Upper respiratory tract infection, unspecified type  -     POCT COVID-19, Antigen  2. Colon cancer screening  -     Fecal DNA Colorectal cancer screening (Cologuard)  3. Type 2 diabetes mellitus with hyperglycemia, without long-term current use of insulin (HCC)  Assessment & Plan:    POCT glycosylated hemoglobin (Hb A1C)  -  A1C stable at 6.6  -  Diabetes Counseling : Discussed disease risks, adopting healthy behaviors,  monitoring glucose and bringing logs to visits  - Discussed most recent labs in detail, foot care, and compliance with all medications, and yearly eye exam  - Diet: Addressed ADA, low sodium and low cholesterol diet.  - Exercise: discussed need for aerobic exercise 3 times weekly  - Medications:   Continued current med dose          Orders:  -     Comprehensive Metabolic Panel, Fasting; Future  -     Hemoglobin A1C; Future  -     Microalbumin / Creatinine Urine Ratio; Future  4. Mixed hyperlipidemia  Assessment & Plan:   Read the labels of the foods you buy from the market to find out how much fat is present. Under 5% of total fat on a label means it is \"low fat\". Over 20% of total fat on a label means it is \"high fat\".  Of total calories that you eat you should only be obtaining 10% or less of these calories from saturated fats.    Eat high fiber foods including green leafy vegetables, fruits, legum's and

## 2024-07-31 NOTE — ASSESSMENT & PLAN NOTE
POCT glycosylated hemoglobin (Hb A1C)  -  A1C stable at 6.6  -  Diabetes Counseling : Discussed disease risks, adopting healthy behaviors,  monitoring glucose and bringing logs to visits  - Discussed most recent labs in detail, foot care, and compliance with all medications, and yearly eye exam  - Diet: Addressed ADA, low sodium and low cholesterol diet.  - Exercise: discussed need for aerobic exercise 3 times weekly  - Medications:   Continued current med dose

## 2024-08-23 LAB — NONINV COLON CA DNA+OCC BLD SCRN STL QL: NEGATIVE

## 2024-08-30 PROBLEM — Z12.11 COLON CANCER SCREENING: Status: RESOLVED | Noted: 2024-07-31 | Resolved: 2024-08-30

## 2024-09-12 ENCOUNTER — OFFICE VISIT (OUTPATIENT)
Dept: FAMILY MEDICINE CLINIC | Age: 54
End: 2024-09-12

## 2024-09-12 ENCOUNTER — HOSPITAL ENCOUNTER (OUTPATIENT)
Dept: LAB | Age: 54
Discharge: HOME | End: 2024-09-12
Payer: MEDICAID

## 2024-09-12 VITALS
TEMPERATURE: 97.9 F | WEIGHT: 139.6 LBS | OXYGEN SATURATION: 98 % | HEART RATE: 69 BPM | BODY MASS INDEX: 22.43 KG/M2 | HEIGHT: 66 IN

## 2024-09-12 DIAGNOSIS — E78.2 MIXED HYPERLIPIDEMIA: ICD-10-CM

## 2024-09-12 DIAGNOSIS — J06.9 URI WITH COUGH AND CONGESTION: ICD-10-CM

## 2024-09-12 DIAGNOSIS — Z13.0 SCREENING FOR DEFICIENCY ANEMIA: ICD-10-CM

## 2024-09-12 DIAGNOSIS — E11.65 TYPE 2 DIABETES MELLITUS WITH HYPERGLYCEMIA, WITHOUT LONG-TERM CURRENT USE OF INSULIN (HCC): ICD-10-CM

## 2024-09-12 DIAGNOSIS — J01.01 ACUTE RECURRENT MAXILLARY SINUSITIS: Primary | ICD-10-CM

## 2024-09-12 DIAGNOSIS — Z13.29 SCREENING FOR THYROID DISORDER: ICD-10-CM

## 2024-09-12 LAB
ALBUMIN SERPL-MCNC: 4.5 G/DL (ref 3.5–4.6)
ALP SERPL-CCNC: 119 U/L (ref 40–130)
ALT SERPL-CCNC: 20 U/L (ref 0–33)
ANION GAP SERPL CALCULATED.3IONS-SCNC: 15 MEQ/L (ref 9–15)
AST SERPL-CCNC: 22 U/L (ref 0–35)
BASOPHILS # BLD: 0 K/UL (ref 0–0.2)
BASOPHILS NFR BLD: 0.5 %
BILIRUB SERPL-MCNC: 0.4 MG/DL (ref 0.2–0.7)
BUN SERPL-MCNC: 13 MG/DL (ref 6–20)
CALCIUM SERPL-MCNC: 9.6 MG/DL (ref 8.5–9.9)
CHLORIDE SERPL-SCNC: 101 MEQ/L (ref 95–107)
CHOLEST SERPL-MCNC: 188 MG/DL (ref 0–199)
CO2 SERPL-SCNC: 22 MEQ/L (ref 20–31)
CREAT SERPL-MCNC: 0.57 MG/DL (ref 0.5–0.9)
CREAT UR-MCNC: 156.6 MG/DL
EOSINOPHIL # BLD: 0.2 K/UL (ref 0–0.7)
EOSINOPHIL NFR BLD: 2.5 %
ERYTHROCYTE [DISTWIDTH] IN BLOOD BY AUTOMATED COUNT: 13 % (ref 11.5–14.5)
GLOBULIN SER CALC-MCNC: 2.9 G/DL (ref 2.3–3.5)
GLUCOSE FASTING: 114 MG/DL (ref 70–99)
HCT VFR BLD AUTO: 43.1 % (ref 37–47)
HDLC SERPL-MCNC: 61 MG/DL (ref 40–59)
HGB BLD-MCNC: 14.5 G/DL (ref 12–16)
LDL CHOLESTEROL: 112 MG/DL (ref 0–129)
LYMPHOCYTES # BLD: 2.5 K/UL (ref 1–4.8)
LYMPHOCYTES NFR BLD: 27.9 %
Lab: NORMAL
MCH RBC QN AUTO: 31.9 PG (ref 27–31.3)
MCHC RBC AUTO-ENTMCNC: 33.6 % (ref 33–37)
MCV RBC AUTO: 94.7 FL (ref 79.4–94.8)
MICROALBUMIN UR-MCNC: 4.2 MG/DL
MICROALBUMIN/CREAT UR-RTO: 26.8 MG/G (ref 0–30)
MONOCYTES # BLD: 0.8 K/UL (ref 0.2–0.8)
MONOCYTES NFR BLD: 8.7 %
NEUTROPHILS # BLD: 5.3 K/UL (ref 1.4–6.5)
NEUTS SEG NFR BLD: 60.2 %
PERFORMING INSTRUMENT: NORMAL
PLATELET # BLD AUTO: 238 K/UL (ref 130–400)
POTASSIUM SERPL-SCNC: 4.3 MEQ/L (ref 3.4–4.9)
PROT SERPL-MCNC: 7.4 G/DL (ref 6.3–8)
QC PASS/FAIL: NORMAL
RBC # BLD AUTO: 4.55 M/UL (ref 4.2–5.4)
SARS-COV-2, POC: NORMAL
SODIUM SERPL-SCNC: 138 MEQ/L (ref 135–144)
TRIGLYCERIDE, FASTING: 73 MG/DL (ref 0–150)
TSH SERPL-MCNC: 0.96 UIU/ML (ref 0.44–3.86)
WBC # BLD AUTO: 8.8 K/UL (ref 4.8–10.8)

## 2024-09-12 PROCEDURE — 85025 COMPLETE CBC W/AUTO DIFF WBC: CPT

## 2024-09-12 PROCEDURE — 80053 COMPREHEN METABOLIC PANEL: CPT

## 2024-09-12 PROCEDURE — 83036 HEMOGLOBIN GLYCOSYLATED A1C: CPT

## 2024-09-12 PROCEDURE — 80061 LIPID PANEL: CPT

## 2024-09-12 PROCEDURE — 82043 UR ALBUMIN QUANTITATIVE: CPT

## 2024-09-12 PROCEDURE — 82570 ASSAY OF URINE CREATININE: CPT

## 2024-09-12 PROCEDURE — 84443 ASSAY THYROID STIM HORMONE: CPT

## 2024-09-12 PROCEDURE — 36415 COLL VENOUS BLD VENIPUNCTURE: CPT

## 2024-09-12 RX ORDER — PSEUDOEPHEDRINE HCL 30 MG
30 TABLET ORAL EVERY 6 HOURS PRN
Qty: 20 TABLET | Refills: 0 | Status: SHIPPED | OUTPATIENT
Start: 2024-09-12

## 2024-09-12 ASSESSMENT — ENCOUNTER SYMPTOMS
DIARRHEA: 0
COUGH: 1
SINUS PAIN: 1
SHORTNESS OF BREATH: 0
VOMITING: 1
NAUSEA: 0
RHINORRHEA: 1
WHEEZING: 0
SINUS PRESSURE: 1

## 2024-09-13 LAB
ESTIMATED AVERAGE GLUCOSE: 120 MG/DL
HBA1C MFR BLD: 5.8 % (ref 4–6)

## 2025-03-04 ENCOUNTER — HOSPITAL ENCOUNTER (EMERGENCY)
Age: 55
Discharge: HOME | End: 2025-03-04
Attending: EMERGENCY MEDICINE
Payer: MEDICAID

## 2025-03-04 ENCOUNTER — APPOINTMENT (OUTPATIENT)
Dept: GENERAL RADIOLOGY | Age: 55
End: 2025-03-04
Payer: MEDICAID

## 2025-03-04 VITALS
HEIGHT: 66 IN | SYSTOLIC BLOOD PRESSURE: 168 MMHG | WEIGHT: 145 LBS | RESPIRATION RATE: 20 BRPM | TEMPERATURE: 97.7 F | OXYGEN SATURATION: 99 % | BODY MASS INDEX: 23.3 KG/M2 | HEART RATE: 75 BPM | DIASTOLIC BLOOD PRESSURE: 91 MMHG

## 2025-03-04 DIAGNOSIS — F41.9 ANXIETY: Primary | ICD-10-CM

## 2025-03-04 LAB
ANION GAP SERPL CALCULATED.3IONS-SCNC: 15 MEQ/L (ref 9–15)
BASOPHILS # BLD: 0.1 K/UL (ref 0–0.1)
BASOPHILS NFR BLD: 1.1 % (ref 0.1–1.2)
BUN SERPL-MCNC: 14 MG/DL (ref 6–20)
CALCIUM SERPL-MCNC: 9.7 MG/DL (ref 8.5–9.9)
CHLORIDE SERPL-SCNC: 103 MEQ/L (ref 95–107)
CO2 SERPL-SCNC: 20 MEQ/L (ref 20–31)
CREAT SERPL-MCNC: 0.51 MG/DL (ref 0.5–0.9)
EKG ATRIAL RATE: 68 BPM
EKG P AXIS: 74 DEGREES
EKG P-R INTERVAL: 144 MS
EKG Q-T INTERVAL: 444 MS
EKG QRS DURATION: 90 MS
EKG QTC CALCULATION (BAZETT): 472 MS
EKG R AXIS: 25 DEGREES
EKG T AXIS: 21 DEGREES
EKG VENTRICULAR RATE: 68 BPM
EOSINOPHIL # BLD: 0.1 K/UL (ref 0–0.4)
EOSINOPHIL NFR BLD: 1.8 % (ref 0.7–5.8)
ERYTHROCYTE [DISTWIDTH] IN BLOOD BY AUTOMATED COUNT: 12.6 % (ref 11.7–14.4)
GLUCOSE SERPL-MCNC: 147 MG/DL (ref 70–99)
HCT VFR BLD AUTO: 43.2 % (ref 37–47)
HGB BLD-MCNC: 15 G/DL (ref 11.2–15.7)
IMM GRANULOCYTES # BLD: 0 K/UL
IMM GRANULOCYTES NFR BLD: 0.5 %
LYMPHOCYTES # BLD: 1.7 K/UL (ref 1.2–3.7)
LYMPHOCYTES NFR BLD: 27.4 %
MCH RBC QN AUTO: 31.8 PG (ref 25.6–32.2)
MCHC RBC AUTO-ENTMCNC: 34.7 % (ref 32.2–35.5)
MCV RBC AUTO: 91.7 FL (ref 79.4–94.8)
MONOCYTES # BLD: 0.7 K/UL (ref 0.2–0.9)
MONOCYTES NFR BLD: 10.6 % (ref 4.7–12.5)
NEUTROPHILS # BLD: 3.7 K/UL (ref 1.6–6.1)
NEUTS SEG NFR BLD: 58.6 % (ref 34–71.1)
PLATELET # BLD AUTO: 191 K/UL (ref 182–369)
POTASSIUM SERPL-SCNC: 4.2 MEQ/L (ref 3.4–4.9)
RBC # BLD AUTO: 4.71 M/UL (ref 3.93–5.22)
SODIUM SERPL-SCNC: 138 MEQ/L (ref 135–144)
TROPONIN, HIGH SENSITIVITY: <6 NG/L (ref 0–19)
WBC # BLD AUTO: 6.2 K/UL (ref 4–10)

## 2025-03-04 PROCEDURE — 84484 ASSAY OF TROPONIN QUANT: CPT

## 2025-03-04 PROCEDURE — 71045 X-RAY EXAM CHEST 1 VIEW: CPT

## 2025-03-04 PROCEDURE — 96360 HYDRATION IV INFUSION INIT: CPT

## 2025-03-04 PROCEDURE — 99285 EMERGENCY DEPT VISIT HI MDM: CPT

## 2025-03-04 PROCEDURE — 80048 BASIC METABOLIC PNL TOTAL CA: CPT

## 2025-03-04 PROCEDURE — 2580000003 HC RX 258: Performed by: EMERGENCY MEDICINE

## 2025-03-04 PROCEDURE — 93005 ELECTROCARDIOGRAM TRACING: CPT

## 2025-03-04 PROCEDURE — 6370000000 HC RX 637 (ALT 250 FOR IP): Performed by: EMERGENCY MEDICINE

## 2025-03-04 PROCEDURE — 85025 COMPLETE CBC W/AUTO DIFF WBC: CPT

## 2025-03-04 PROCEDURE — 36415 COLL VENOUS BLD VENIPUNCTURE: CPT

## 2025-03-04 RX ORDER — HYDROXYZINE HYDROCHLORIDE 25 MG/1
25 TABLET, FILM COATED ORAL ONCE
Status: COMPLETED | OUTPATIENT
Start: 2025-03-04 | End: 2025-03-04

## 2025-03-04 RX ORDER — 0.9 % SODIUM CHLORIDE 0.9 %
1000 INTRAVENOUS SOLUTION INTRAVENOUS ONCE
Status: COMPLETED | OUTPATIENT
Start: 2025-03-04 | End: 2025-03-04

## 2025-03-04 RX ORDER — HYDROXYZINE PAMOATE 25 MG/1
25 CAPSULE ORAL EVERY 6 HOURS PRN
Qty: 12 CAPSULE | Refills: 0 | Status: SHIPPED | OUTPATIENT
Start: 2025-03-04 | End: 2025-03-07

## 2025-03-04 RX ADMIN — HYDROXYZINE HYDROCHLORIDE 25 MG: 25 TABLET, FILM COATED ORAL at 10:26

## 2025-03-04 RX ADMIN — SODIUM CHLORIDE 1000 ML: 0.9 INJECTION, SOLUTION INTRAVENOUS at 10:26

## 2025-03-04 ASSESSMENT — ENCOUNTER SYMPTOMS
EYE DISCHARGE: 0
BACK PAIN: 0
COUGH: 0
COLOR CHANGE: 0
ABDOMINAL PAIN: 0
DIARRHEA: 0
SHORTNESS OF BREATH: 1
SINUS PAIN: 0
VOMITING: 1
NAUSEA: 1
EYE REDNESS: 0
SORE THROAT: 0

## 2025-03-04 ASSESSMENT — PAIN - FUNCTIONAL ASSESSMENT
PAIN_FUNCTIONAL_ASSESSMENT: NONE - DENIES PAIN
PAIN_FUNCTIONAL_ASSESSMENT: NONE - DENIES PAIN

## 2025-03-04 ASSESSMENT — LIFESTYLE VARIABLES
HOW OFTEN DO YOU HAVE A DRINK CONTAINING ALCOHOL: 4 OR MORE TIMES A WEEK
HOW MANY STANDARD DRINKS CONTAINING ALCOHOL DO YOU HAVE ON A TYPICAL DAY: 3 OR 4

## 2025-03-04 NOTE — ED PROVIDER NOTES
Neg Hx      Labor Neg Hx     Spont Abortions Neg Hx     Stroke Neg Hx           SOCIAL HISTORY       Social History     Socioeconomic History    Marital status:      Spouse name: None    Number of children: None    Years of education: None    Highest education level: None   Tobacco Use    Smoking status: Some Days     Current packs/day: 0.50     Average packs/day: 0.5 packs/day for 25.0 years (12.5 ttl pk-yrs)     Types: Cigarettes    Smokeless tobacco: Never   Vaping Use    Vaping status: Never Used   Substance and Sexual Activity    Alcohol use: Yes    Drug use: No     Social Determinants of Health     Financial Resource Strain: Patient Declined (2024)    Overall Financial Resource Strain (CARDIA)     Difficulty of Paying Living Expenses: Patient declined   Food Insecurity: Patient Declined (2024)    Hunger Vital Sign     Worried About Running Out of Food in the Last Year: Patient declined     Ran Out of Food in the Last Year: Patient declined   Transportation Needs: Unknown (2024)    PRAPARE - Transportation     Lack of Transportation (Non-Medical): Patient declined   Physical Activity: Insufficiently Active (2024)    Exercise Vital Sign     Days of Exercise per Week: 3 days     Minutes of Exercise per Session: 30 min   Housing Stability: Unknown (2024)    Housing Stability Vital Sign     Unstable Housing in the Last Year: Patient declined           CIWA Assessment  BP: (!) 168/91  Pulse: 75  Nausea and Vomiting: no nausea and no vomiting  Tactile Disturbances: none  Tremor: no tremor  Auditory Disturbances: not present  Paroxysmal Sweats: no sweat visible  Visual Disturbances: not present  Anxiety: no anxiety, at ease  Headache, Fullness in Head: none present  Agitation: normal activity  Orientation and Clouding of Sensorium: oriented and can do serial additions  CIWA-Ar Total: 0                 PHYSICAL EXAM       ED Triage Vitals [25 0945]   BP Systolic BP

## 2025-03-07 ENCOUNTER — OFFICE VISIT (OUTPATIENT)
Dept: FAMILY MEDICINE CLINIC | Age: 55
End: 2025-03-07
Payer: MEDICAID

## 2025-03-07 VITALS
HEIGHT: 66 IN | SYSTOLIC BLOOD PRESSURE: 132 MMHG | TEMPERATURE: 98.4 F | WEIGHT: 151.6 LBS | OXYGEN SATURATION: 98 % | BODY MASS INDEX: 24.36 KG/M2 | HEART RATE: 66 BPM | DIASTOLIC BLOOD PRESSURE: 76 MMHG

## 2025-03-07 DIAGNOSIS — F17.200 CURRENT SMOKER: ICD-10-CM

## 2025-03-07 DIAGNOSIS — Z13.29 SCREENING FOR THYROID DISORDER: ICD-10-CM

## 2025-03-07 DIAGNOSIS — Z13.0 SCREENING FOR DEFICIENCY ANEMIA: ICD-10-CM

## 2025-03-07 DIAGNOSIS — Z12.31 ENCOUNTER FOR SCREENING MAMMOGRAM FOR MALIGNANT NEOPLASM OF BREAST: ICD-10-CM

## 2025-03-07 DIAGNOSIS — R73.03 PRE-DIABETES: ICD-10-CM

## 2025-03-07 DIAGNOSIS — F41.0 PANIC ATTACK: Primary | ICD-10-CM

## 2025-03-07 DIAGNOSIS — E78.2 MIXED HYPERLIPIDEMIA: ICD-10-CM

## 2025-03-07 PROBLEM — F10.10 ETOH ABUSE: Status: RESOLVED | Noted: 2018-11-15 | Resolved: 2025-03-07

## 2025-03-07 PROBLEM — L30.8 SPONGIOTIC DERMATITIS: Status: RESOLVED | Noted: 2024-05-22 | Resolved: 2025-03-07

## 2025-03-07 PROBLEM — J06.9 UPPER RESPIRATORY TRACT INFECTION: Status: RESOLVED | Noted: 2024-07-31 | Resolved: 2025-03-07

## 2025-03-07 PROBLEM — R91.8 LUNG NODULES: Status: RESOLVED | Noted: 2019-02-14 | Resolved: 2025-03-07

## 2025-03-07 PROBLEM — E11.65 TYPE 2 DIABETES MELLITUS WITH HYPERGLYCEMIA, WITHOUT LONG-TERM CURRENT USE OF INSULIN (HCC): Status: RESOLVED | Noted: 2024-04-11 | Resolved: 2025-03-07

## 2025-03-07 PROCEDURE — 99214 OFFICE O/P EST MOD 30 MIN: CPT | Performed by: INTERNAL MEDICINE

## 2025-03-07 RX ORDER — BUSPIRONE HYDROCHLORIDE 5 MG/1
5 TABLET ORAL 2 TIMES DAILY
Qty: 60 TABLET | Refills: 0 | Status: SHIPPED | OUTPATIENT
Start: 2025-03-07 | End: 2025-04-06

## 2025-03-07 SDOH — ECONOMIC STABILITY: FOOD INSECURITY: WITHIN THE PAST 12 MONTHS, THE FOOD YOU BOUGHT JUST DIDN'T LAST AND YOU DIDN'T HAVE MONEY TO GET MORE.: NEVER TRUE

## 2025-03-07 SDOH — ECONOMIC STABILITY: FOOD INSECURITY: WITHIN THE PAST 12 MONTHS, YOU WORRIED THAT YOUR FOOD WOULD RUN OUT BEFORE YOU GOT MONEY TO BUY MORE.: NEVER TRUE

## 2025-03-07 ASSESSMENT — ENCOUNTER SYMPTOMS
BLOOD IN STOOL: 0
CHEST TIGHTNESS: 0
ABDOMINAL PAIN: 0
SHORTNESS OF BREATH: 0
VOICE CHANGE: 0
DIARRHEA: 0
NAUSEA: 0
CONSTIPATION: 0
WHEEZING: 0
SINUS PAIN: 0
COUGH: 0

## 2025-03-07 ASSESSMENT — PATIENT HEALTH QUESTIONNAIRE - PHQ9
2. FEELING DOWN, DEPRESSED OR HOPELESS: NOT AT ALL
3. TROUBLE FALLING OR STAYING ASLEEP: NOT AT ALL
SUM OF ALL RESPONSES TO PHQ QUESTIONS 1-9: 0
9. THOUGHTS THAT YOU WOULD BE BETTER OFF DEAD, OR OF HURTING YOURSELF: NOT AT ALL
10. IF YOU CHECKED OFF ANY PROBLEMS, HOW DIFFICULT HAVE THESE PROBLEMS MADE IT FOR YOU TO DO YOUR WORK, TAKE CARE OF THINGS AT HOME, OR GET ALONG WITH OTHER PEOPLE: NOT DIFFICULT AT ALL
SUM OF ALL RESPONSES TO PHQ QUESTIONS 1-9: 0
1. LITTLE INTEREST OR PLEASURE IN DOING THINGS: NOT AT ALL
4. FEELING TIRED OR HAVING LITTLE ENERGY: NOT AT ALL
7. TROUBLE CONCENTRATING ON THINGS, SUCH AS READING THE NEWSPAPER OR WATCHING TELEVISION: NOT AT ALL
SUM OF ALL RESPONSES TO PHQ QUESTIONS 1-9: 0
SUM OF ALL RESPONSES TO PHQ QUESTIONS 1-9: 0
5. POOR APPETITE OR OVEREATING: NOT AT ALL
8. MOVING OR SPEAKING SO SLOWLY THAT OTHER PEOPLE COULD HAVE NOTICED. OR THE OPPOSITE, BEING SO FIGETY OR RESTLESS THAT YOU HAVE BEEN MOVING AROUND A LOT MORE THAN USUAL: NOT AT ALL
6. FEELING BAD ABOUT YOURSELF - OR THAT YOU ARE A FAILURE OR HAVE LET YOURSELF OR YOUR FAMILY DOWN: NOT AT ALL

## 2025-03-07 NOTE — PROGRESS NOTES
[facility]  [address]  [address]  [place] [facility] [address]  Dept: [phone]  Dept Fax: [phone]  Loc: [phone]     3/7/2025    Visit type: Office visit    The patient (or guardian, if applicable) and other individuals in attendance with the patient were advised that Artificial Intelligence will be utilized during this visit to record, process the conversation to generate a clinical note, and support improvement of the AI technology. The patient (or guardian, if applicable) and other individuals in attendance at the appointment consented to the use of AI, including the recording.      Reason for Visit: Discuss Medications (Would like to be prescribed klonopin 0.5 mg. Has been prescribed in the past. Uses sparingly. )       ASSESSMENT/PLAN     Assessment & Plan  1. Health maintenance.  Her chest x-ray results are within normal limits. She is due for a mammogram and comprehensive blood work in September 2025. Her Cologuard test is also due. A CT lung scan will be ordered, and she is advised to consult with her insurance provider regarding coverage. An order for a mammogram and blood work has been placed. She is instructed to fast prior to her blood work appointment in September 2025.    2. Panic attacks.  She has been informed that clonazepam is a controlled substance and can not be prescribed. The potential side effects of clonazepam, including addiction and dependency, have been discussed. A prescription for BuSpar 5 mg has been provided, to be taken as needed. If her anxiety or panic attacks become frequent, she may consider taking BuSpar twice daily. A referral to a psychiatrist has been offered for a second opinion.    Follow-up  The patient will follow up in September 2025.    PROCEDURE  The patient underwent a hysterectomy in the past.     1. Panic attack  2. Mixed hyperlipidemia  -     Lipid, Fasting; Future  3. Pre-diabetes  -     Comprehensive

## 2025-04-06 PROBLEM — Z13.29 SCREENING FOR THYROID DISORDER: Status: RESOLVED | Noted: 2025-03-07 | Resolved: 2025-04-06

## 2025-04-06 PROBLEM — Z13.0 SCREENING FOR DEFICIENCY ANEMIA: Status: RESOLVED | Noted: 2025-03-07 | Resolved: 2025-04-06

## 2025-04-06 PROBLEM — Z12.31 ENCOUNTER FOR SCREENING MAMMOGRAM FOR MALIGNANT NEOPLASM OF BREAST: Status: RESOLVED | Noted: 2025-03-07 | Resolved: 2025-04-06

## 2025-07-07 ENCOUNTER — OFFICE VISIT (OUTPATIENT)
Dept: FAMILY MEDICINE CLINIC | Age: 55
End: 2025-07-07
Payer: MEDICAID

## 2025-07-07 VITALS
SYSTOLIC BLOOD PRESSURE: 132 MMHG | OXYGEN SATURATION: 97 % | HEART RATE: 73 BPM | HEIGHT: 66 IN | WEIGHT: 153 LBS | RESPIRATION RATE: 18 BRPM | BODY MASS INDEX: 24.59 KG/M2 | TEMPERATURE: 98.5 F | DIASTOLIC BLOOD PRESSURE: 80 MMHG

## 2025-07-07 DIAGNOSIS — Z87.891 PERSONAL HISTORY OF TOBACCO USE: ICD-10-CM

## 2025-07-07 DIAGNOSIS — R73.03 PRE-DIABETES: ICD-10-CM

## 2025-07-07 DIAGNOSIS — R10.12 LUQ PAIN: ICD-10-CM

## 2025-07-07 DIAGNOSIS — E78.2 MIXED HYPERLIPIDEMIA: ICD-10-CM

## 2025-07-07 DIAGNOSIS — F41.0 PANIC ATTACK: ICD-10-CM

## 2025-07-07 DIAGNOSIS — R06.02 SOB (SHORTNESS OF BREATH): Primary | ICD-10-CM

## 2025-07-07 PROCEDURE — 93000 ELECTROCARDIOGRAM COMPLETE: CPT | Performed by: INTERNAL MEDICINE

## 2025-07-07 PROCEDURE — G0296 VISIT TO DETERM LDCT ELIG: HCPCS | Performed by: INTERNAL MEDICINE

## 2025-07-07 PROCEDURE — 99214 OFFICE O/P EST MOD 30 MIN: CPT | Performed by: INTERNAL MEDICINE

## 2025-07-07 RX ORDER — BUPROPION HYDROCHLORIDE 150 MG/1
150 TABLET ORAL EVERY MORNING
Qty: 30 TABLET | Refills: 3 | Status: SHIPPED | OUTPATIENT
Start: 2025-07-07

## 2025-07-07 ASSESSMENT — ENCOUNTER SYMPTOMS
NAUSEA: 0
BLOOD IN STOOL: 0
WHEEZING: 0
COUGH: 1
ABDOMINAL PAIN: 0
CONSTIPATION: 0
VOICE CHANGE: 0
CHEST TIGHTNESS: 0
DIARRHEA: 0
SINUS PAIN: 0
SHORTNESS OF BREATH: 1

## 2025-07-07 NOTE — PATIENT INSTRUCTIONS
follow-up, he or she will help you understand what to do next.  After a lung cancer screening, you can go back to your usual activities right away.  A lung cancer screening test can't tell if you have lung cancer. If your results are positive, your doctor can't tell whether an abnormal finding is a harmless nodule, cancer, or something else without doing more tests.  What can you do to help prevent lung cancer?  Some lung cancers can't be prevented. But if you smoke, quitting smoking is the best step you can take to prevent lung cancer. If you want to quit, your doctor can recommend medicines or other ways to help.  Follow-up care is a key part of your treatment and safety. Be sure to make and go to all appointments, and call your doctor if you are having problems. It's also a good idea to know your test results and keep a list of the medicines you take.  Where can you learn more?  Go to https://www.[employer].net/patientEd and enter Q940 to learn more about \"Learning About Lung Cancer Screening.\"  Current as of: October 25, 2024  Content Version: 14.5  © 2404-5909 [employer].   Care instructions adapted under license by [employer]. If you have questions about a medical condition or this instruction, always ask your healthcare professional. [employer] disclaims any warranty or liability for your use of this information.

## 2025-07-07 NOTE — PROGRESS NOTES
[facility]  [address]  [address]  [facility] [address]  Dept: [phone]  Dept Fax: [phone]  Loc: [phone]     7/7/2025    Visit type: Acute care    The patient (or guardian, if applicable) and other individuals in attendance with the patient were advised that Artificial Intelligence will be utilized during this visit to record, process the conversation to generate a clinical note, and support improvement of the AI technology. The patient (or guardian, if applicable) and other individuals in attendance at the appointment consented to the use of AI, including the recording.      Reason for Visit: Breathing Problem       ASSESSMENT/PLAN     Assessment & Plan  1. Left upper quadrant pain.  - Discomfort in the left upper quadrant, likely due to a pulled muscle from lifting activities.  - Physical exam reveals clear lungs and localized pain near the spleen area.  - Ultrasound ordered to further investigate the cause of the pain.  - Patient prefers to get the ultrasound done to ensure safety.    2. Shortness of breath.  - Reports shortness of breath both at rest and on exertion, worsens in hot and humid weather.  - Persistent cough with postnasal drip, possibly related to allergies and anxiety.  - Lung scan ordered to evaluate lung condition.  - EKG ordered due to risk factors including smoking and high cholesterol.    3. Anxiety.  - Experiencing anxiety, contributing to symptoms; previous medication ineffective.  - Wellbutrin prescribed to be taken once daily to manage anxiety and assist in smoking cessation.  - Discussion on Wellbutrin benefits for anxiety and smoking cessation.  - Patient expressed concern about vivid dreams; clarified that Wellbutrin does not cause this side effect.    4. Health maintenance.  - Mammogram and blood work previously ordered in 03/2025 not completed.  - Advised to complete fasting blood work, mammogram, and EKG.  -

## 2025-07-14 ENCOUNTER — HOSPITAL ENCOUNTER (OUTPATIENT)
Dept: ULTRASOUND IMAGING | Age: 55
Discharge: HOME | End: 2025-07-16
Payer: MEDICAID

## 2025-07-14 ENCOUNTER — HOSPITAL ENCOUNTER (OUTPATIENT)
Dept: WOMENS IMAGING | Age: 55
Discharge: HOME | End: 2025-07-16
Attending: INTERNAL MEDICINE
Payer: MEDICAID

## 2025-07-14 DIAGNOSIS — Z12.31 ENCOUNTER FOR SCREENING MAMMOGRAM FOR MALIGNANT NEOPLASM OF BREAST: ICD-10-CM

## 2025-07-14 DIAGNOSIS — R10.12 LUQ PAIN: ICD-10-CM

## 2025-07-14 PROCEDURE — 77063 BREAST TOMOSYNTHESIS BI: CPT

## 2025-07-17 ENCOUNTER — HOSPITAL ENCOUNTER (OUTPATIENT)
Dept: ULTRASOUND IMAGING | Age: 55
Discharge: HOME | End: 2025-07-19
Attending: INTERNAL MEDICINE
Payer: MEDICAID

## 2025-07-17 PROCEDURE — 76705 ECHO EXAM OF ABDOMEN: CPT

## 2025-07-21 ENCOUNTER — OFFICE VISIT (OUTPATIENT)
Dept: FAMILY MEDICINE CLINIC | Age: 55
End: 2025-07-21
Payer: MEDICAID

## 2025-07-21 ENCOUNTER — HOSPITAL ENCOUNTER (OUTPATIENT)
Dept: CT IMAGING | Age: 55
Discharge: HOME | End: 2025-07-23
Attending: INTERNAL MEDICINE
Payer: MEDICAID

## 2025-07-21 VITALS
HEIGHT: 66 IN | HEART RATE: 78 BPM | DIASTOLIC BLOOD PRESSURE: 74 MMHG | BODY MASS INDEX: 24.33 KG/M2 | WEIGHT: 151.4 LBS | TEMPERATURE: 97.8 F | OXYGEN SATURATION: 98 % | SYSTOLIC BLOOD PRESSURE: 126 MMHG

## 2025-07-21 DIAGNOSIS — Z87.891 PERSONAL HISTORY OF TOBACCO USE: ICD-10-CM

## 2025-07-21 DIAGNOSIS — F17.200 CURRENT SMOKER: ICD-10-CM

## 2025-07-21 DIAGNOSIS — K22.9 ESOPHAGEAL ABNORMALITY: Primary | ICD-10-CM

## 2025-07-21 DIAGNOSIS — R06.02 SOB (SHORTNESS OF BREATH): ICD-10-CM

## 2025-07-21 PROBLEM — R10.12 LUQ PAIN: Status: RESOLVED | Noted: 2025-07-07 | Resolved: 2025-07-21

## 2025-07-21 PROCEDURE — 71271 CT THORAX LUNG CANCER SCR C-: CPT

## 2025-07-21 PROCEDURE — 99214 OFFICE O/P EST MOD 30 MIN: CPT | Performed by: INTERNAL MEDICINE

## 2025-07-21 ASSESSMENT — ENCOUNTER SYMPTOMS
CONSTIPATION: 0
ABDOMINAL PAIN: 0
VOICE CHANGE: 0
EYE PAIN: 0
COLOR CHANGE: 0
PHOTOPHOBIA: 0
BLOOD IN STOOL: 0
NAUSEA: 0

## 2025-07-21 NOTE — PROGRESS NOTES
rhonchi or rales.   Abdominal:      General: Abdomen is flat. Bowel sounds are normal.      Palpations: Abdomen is soft.   Musculoskeletal:         General: Normal range of motion.      Cervical back: Normal range of motion and neck supple.      Right lower leg: No edema.      Left lower leg: No edema.   Skin:     General: Skin is warm and dry.      Findings: No lesion or rash.   Neurological:      General: No focal deficit present.      Mental Status: She is alert and oriented to person, place, and time. Mental status is at baseline.      Gait: Gait normal.   Psychiatric:         Mood and Affect: Mood normal.         Behavior: Behavior normal.         Thought Content: Thought content normal.         Judgment: Judgment normal.        Results for orders placed or performed during the hospital encounter of 07/21/25   CT LUNG CANCER SCREENING (INITIAL/ANNUAL)    Narrative    EXAMINATION:  LOW DOSE SCREENING CT OF THE CHEST WITHOUT CONTRAST    7/21/2025 1:12 pm    TECHNIQUE:  Low dose lung cancer screening CT of the chest was performed without the  administration of intravenous contrast.  Multiplanar reformatted images are  provided for review. Automated exposure control, iterative reconstruction,  and/or weight based adjustment of the mA/kV was utilized to reduce the  radiation dose to as low as reasonably achievable.    COMPARISON:  None.    HISTORY:  ORDERING SYSTEM PROVIDED HISTORY: SOB (shortness of breath), Personal history  of tobacco use  TECHNOLOGIST PROVIDED HISTORY:  Is there documentation of shared decision making?->Yes  Does the patient show any signs or symptoms of lung cancer?->No  Is this the first (baseline) CT or an annual exam?->Baseline  Is this a low dose CT or a routine CT?->Low Dose CT  Smoking Status?->Every Day  Pack Years->30  What reading provider will be dictating this exam?->CRC    FINDINGS:  Mediastinum: Thyroid is homogeneous in attenuation. No bulky mediastinal  adenopathy. Central